# Patient Record
Sex: FEMALE | Race: WHITE | NOT HISPANIC OR LATINO | Employment: OTHER | URBAN - NONMETROPOLITAN AREA
[De-identification: names, ages, dates, MRNs, and addresses within clinical notes are randomized per-mention and may not be internally consistent; named-entity substitution may affect disease eponyms.]

---

## 2023-05-10 LAB
ALANINE AMINOTRANSFERASE (SGPT) (U/L) IN SER/PLAS: 16 U/L (ref 7–45)
ALBUMIN (G/DL) IN SER/PLAS: 4.1 G/DL (ref 3.4–5)
ALKALINE PHOSPHATASE (U/L) IN SER/PLAS: 70 U/L (ref 33–136)
ANION GAP IN SER/PLAS: 13 MMOL/L (ref 10–20)
ASPARTATE AMINOTRANSFERASE (SGOT) (U/L) IN SER/PLAS: 13 U/L (ref 9–39)
BASOPHILS (10*3/UL) IN BLOOD BY AUTOMATED COUNT: 0.11 X10E9/L (ref 0–0.1)
BASOPHILS/100 LEUKOCYTES IN BLOOD BY AUTOMATED COUNT: 1.2 % (ref 0–2)
BILIRUBIN TOTAL (MG/DL) IN SER/PLAS: 0.3 MG/DL (ref 0–1.2)
CALCIUM (MG/DL) IN SER/PLAS: 9.4 MG/DL (ref 8.6–10.3)
CARBON DIOXIDE, TOTAL (MMOL/L) IN SER/PLAS: 27 MMOL/L (ref 21–32)
CHLORIDE (MMOL/L) IN SER/PLAS: 102 MMOL/L (ref 98–107)
CHOLESTEROL (MG/DL) IN SER/PLAS: 175 MG/DL (ref 0–199)
CHOLESTEROL IN HDL (MG/DL) IN SER/PLAS: 43.8 MG/DL
CHOLESTEROL/HDL RATIO: 4
CREATININE (MG/DL) IN SER/PLAS: 0.72 MG/DL (ref 0.5–1.05)
EOSINOPHILS (10*3/UL) IN BLOOD BY AUTOMATED COUNT: 0.26 X10E9/L (ref 0–0.7)
EOSINOPHILS/100 LEUKOCYTES IN BLOOD BY AUTOMATED COUNT: 2.9 % (ref 0–6)
ERYTHROCYTE DISTRIBUTION WIDTH (RATIO) BY AUTOMATED COUNT: 15.1 % (ref 11.5–14.5)
ERYTHROCYTE MEAN CORPUSCULAR HEMOGLOBIN CONCENTRATION (G/DL) BY AUTOMATED: 31.9 G/DL (ref 32–36)
ERYTHROCYTE MEAN CORPUSCULAR VOLUME (FL) BY AUTOMATED COUNT: 94 FL (ref 80–100)
ERYTHROCYTES (10*6/UL) IN BLOOD BY AUTOMATED COUNT: 5.09 X10E12/L (ref 4–5.2)
GFR FEMALE: >90 ML/MIN/1.73M2
GLUCOSE (MG/DL) IN SER/PLAS: 85 MG/DL (ref 74–99)
HEMATOCRIT (%) IN BLOOD BY AUTOMATED COUNT: 47.6 % (ref 36–46)
HEMOGLOBIN (G/DL) IN BLOOD: 15.2 G/DL (ref 12–16)
IMMATURE GRANULOCYTES/100 LEUKOCYTES IN BLOOD BY AUTOMATED COUNT: 0.3 % (ref 0–0.9)
LDL: 95 MG/DL (ref 0–99)
LEUKOCYTES (10*3/UL) IN BLOOD BY AUTOMATED COUNT: 9 X10E9/L (ref 4.4–11.3)
LYMPHOCYTES (10*3/UL) IN BLOOD BY AUTOMATED COUNT: 3.37 X10E9/L (ref 1.2–4.8)
LYMPHOCYTES/100 LEUKOCYTES IN BLOOD BY AUTOMATED COUNT: 37.6 % (ref 13–44)
MONOCYTES (10*3/UL) IN BLOOD BY AUTOMATED COUNT: 0.81 X10E9/L (ref 0.1–1)
MONOCYTES/100 LEUKOCYTES IN BLOOD BY AUTOMATED COUNT: 9 % (ref 2–10)
NEUTROPHILS (10*3/UL) IN BLOOD BY AUTOMATED COUNT: 4.39 X10E9/L (ref 1.2–7.7)
NEUTROPHILS/100 LEUKOCYTES IN BLOOD BY AUTOMATED COUNT: 49 % (ref 40–80)
PLATELETS (10*3/UL) IN BLOOD AUTOMATED COUNT: 290 X10E9/L (ref 150–450)
POTASSIUM (MMOL/L) IN SER/PLAS: 4.5 MMOL/L (ref 3.5–5.3)
PROTEIN TOTAL: 6.7 G/DL (ref 6.4–8.2)
SODIUM (MMOL/L) IN SER/PLAS: 137 MMOL/L (ref 136–145)
THYROTROPIN (MIU/L) IN SER/PLAS BY DETECTION LIMIT <= 0.05 MIU/L: 1.65 MIU/L (ref 0.44–3.98)
TRIGLYCERIDE (MG/DL) IN SER/PLAS: 181 MG/DL (ref 0–149)
UREA NITROGEN (MG/DL) IN SER/PLAS: 15 MG/DL (ref 6–23)
VLDL: 36 MG/DL (ref 0–40)

## 2023-11-06 NOTE — PROGRESS NOTES
"Kenya Diallo is a 63 y.o. female who presents for Establish Care (Left knee pain, sometimes unable to walk; has had injections and xray; does not do flu shot)    Here to establish care.    Left knee pain: Hurting for a couple months. Started with a medication and got a shot but the pain didn't improve much. Pain with standing, climbing stairs. Had xray done as well. No specific injury. Superomedial surface of the knee.    Review of systems completed and unremarkable other than what is documented in HPI.     Social history: she smokes 1/2 ppd to 1 ppd, she does not drink alcohol, she is retired as of a year ago, working in factory  Medical history: None  Medications: None  SurgHx: breast reduction, cholecystectomy  Fhx: brother had cancer, started underneath the armpit and  8mo after her mom, mom had rheumatic fever and heart problems, had valve replacement, dad  in March of this year, unsure cause of death  Allergies: NKDA    Objective   /75 (BP Location: Left arm, Patient Position: Sitting, BP Cuff Size: Large adult)   Pulse 79   Ht 1.562 m (5' 1.5\")   Wt 83 kg (183 lb)   BMI 34.02 kg/m²     Gen: No acute distress, alert and oriented x3, pleasant   HEENT: moist mucous membranes, b/l external auditory canals are clear of debris, TMs within normal limits, no oropharyngeal lesions, eomi, perrla   Neck: thyroid within normal limits, no lymphadenopathy   CV: RRR, normal S1/S2, no murmur   Resp: Clear to auscultation bilaterally, no wheezes or rhonchi appreciated  Abd: soft, nontender, non-distended, no guarding/rigidity, bowel sounds present  Extr: no edema, no calf tenderness  Derm: Skin is warm and dry, no rashes appreciated  Psych: mood is good, affect is congruent, good hygiene, normal speech and eye contact  Neuro: cranial nerves grossly intact, normal gait  MSK: left knee with Mild effusion, severe ttp medial and lateral joint line    Assessment/Plan     #Knee pain  No improvement with " injections  Xray knee was normal  Ordering MRI    HCM:  Declining mammogram  Declining flu vaccine  Declining C-scope

## 2023-11-13 ENCOUNTER — OFFICE VISIT (OUTPATIENT)
Dept: PRIMARY CARE | Facility: CLINIC | Age: 63
End: 2023-11-13
Payer: COMMERCIAL

## 2023-11-13 VITALS
WEIGHT: 183 LBS | HEIGHT: 62 IN | DIASTOLIC BLOOD PRESSURE: 75 MMHG | SYSTOLIC BLOOD PRESSURE: 110 MMHG | BODY MASS INDEX: 33.68 KG/M2 | HEART RATE: 79 BPM

## 2023-11-13 DIAGNOSIS — M25.562 ACUTE PAIN OF LEFT KNEE: Primary | ICD-10-CM

## 2023-11-13 PROCEDURE — 1159F MED LIST DOCD IN RCRD: CPT | Performed by: FAMILY MEDICINE

## 2023-11-13 PROCEDURE — 99204 OFFICE O/P NEW MOD 45 MIN: CPT | Performed by: FAMILY MEDICINE

## 2023-11-13 PROCEDURE — 1036F TOBACCO NON-USER: CPT | Performed by: FAMILY MEDICINE

## 2023-11-13 NOTE — PATIENT INSTRUCTIONS
Orthopedics:   [facility] [phone]  [facility]: [phone] (Dr. Allen)    Radiology:  [place]:  [phone]

## 2023-12-01 ENCOUNTER — HOSPITAL ENCOUNTER (OUTPATIENT)
Dept: RADIOLOGY | Facility: HOSPITAL | Age: 63
Discharge: HOME | End: 2023-12-01
Payer: COMMERCIAL

## 2023-12-01 DIAGNOSIS — M25.562 ACUTE PAIN OF LEFT KNEE: ICD-10-CM

## 2023-12-01 PROCEDURE — 73721 MRI JNT OF LWR EXTRE W/O DYE: CPT | Mod: LT

## 2023-12-02 PROCEDURE — 73721 MRI JNT OF LWR EXTRE W/O DYE: CPT | Mod: LEFT SIDE | Performed by: RADIOLOGY

## 2023-12-05 ENCOUNTER — OFFICE VISIT (OUTPATIENT)
Dept: ORTHOPEDIC SURGERY | Facility: CLINIC | Age: 63
End: 2023-12-05
Payer: COMMERCIAL

## 2023-12-05 DIAGNOSIS — M25.562 ACUTE PAIN OF LEFT KNEE: Primary | ICD-10-CM

## 2023-12-05 PROCEDURE — 1036F TOBACCO NON-USER: CPT | Performed by: ORTHOPAEDIC SURGERY

## 2023-12-05 PROCEDURE — 1160F RVW MEDS BY RX/DR IN RCRD: CPT | Performed by: ORTHOPAEDIC SURGERY

## 2023-12-05 PROCEDURE — 1159F MED LIST DOCD IN RCRD: CPT | Performed by: ORTHOPAEDIC SURGERY

## 2023-12-05 PROCEDURE — 99204 OFFICE O/P NEW MOD 45 MIN: CPT | Performed by: ORTHOPAEDIC SURGERY

## 2023-12-05 RX ORDER — DICLOFENAC SODIUM 10 MG/G
4 GEL TOPICAL 4 TIMES DAILY
Qty: 244 G | Refills: 1 | Status: SHIPPED | OUTPATIENT
Start: 2023-12-05

## 2023-12-05 NOTE — LETTER
December 5, 2023     Kimberly Benavides DO  [address]  [place] [address]    Patient: Kenya Diallo   YOB: 1960   Date of Visit: 12/5/2023       Dear Dr. Kimberly Benavides DO:    Thank you for referring Kenya Diallo to me for evaluation. Below are my notes for this consultation.  If you have questions, please do not hesitate to call me. I look forward to following your patient along with you.       Sincerely,     Liborio Royal MD      CC: No Recipients  ______________________________________________________________________________________    This is a consultation from Dr. Kimberly Benavides DO for No chief complaint on file.      This is a 63 y.o. female who presents for evaluation of left knee pain.  This is a chronic issue the patient is going on since last summer.  She complains of sharp pain over the medial knee worse with walking proving with rest.  It began after she did extensive walking.  No numbness or tingling no fevers or chills no shooting pain down the leg.  She had an injection in the past which was not very helpful.  She has tried taking anti-inflammatories, but she has difficulty with stomach upset.  She has not not done any PT or worn a brace she does not use a cane.  No numbness or tingling.    Physical Exam    There has been no interval change in this patient's past medical, surgical, medications, allergies, family history or social history since the most recent visit to a provider within our department. 14 point review of systems was performed, reviewed, and negative except for pertinent positives documented in the history of present illness.     Constitutional: well developed, well nourished female in no acute distress  Psychiatric: normal mood, appropriate affect  Eyes: sclera anicteric  HENT: normocephalic/atraumatic  CV: regular rate and rhythm   Respiratory: non labored breathing  Integumentary: no rash  Neurological: moves all extremities    Left  knee exam: skin intact no lacerations or abrations.  1+ effusion.  Tender medial joint line. negative log roll negative patellar grind. ROM 0-120. stable to varus and valgus stress at 0 and 30 degrees. negative lachman negative posterior drawer negative madison. 5/5 ehl/fhl/gs/ta. silt s/s/sp/dp/t. 2+ dp/pt        Xrays were ordered by me, they were reviewed and independently interpreted by me today, they show mild degenerative changes, MRI was reviewed shows degenerative meniscal tear    Procedures      Impression/Plan: This is a 63 y.o. 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  I had an in depth discussion with the patient regarding treatment options for arthritis and their relative risks and benefits. We reviewed surgical and nonsurgical option for treatment. Treatments include anti inflammatory medications, physical therapy, weight loss, activity modification, use of assistive devices, injection therapies. We discussed current prescriptions and risks and benefits of continuation of prescription medication as apporpriate. We discussed that arthritis is often progressive over time, an in end stage arthritis surgical interventions can be considered, including arthroplasty. All questions were answered and the patient voiced their understanding.  Will try topical anti-inflammatory and physical therapy.  Will see her back and we can get repeat weightbearing x-rays if she still having symptoms or these measures are insufficient    BMI Readings from Last 1 Encounters:   12/01/23 34.02 kg/m²      Lab Results   Component Value Date    CREATININE 0.72 05/10/2023     Tobacco Use: Low Risk  (11/13/2023)    Patient History    • Smoking Tobacco Use: Never    • Smokeless Tobacco Use: Never    • Passive Exposure: Never      Computed MELD 3.0 unavailable. Necessary lab results were not found in the last year.  Computed MELD-Na unavailable. Necessary lab results were not found in the last year.       No  "results found for: \"HGBA1C\"  No results found for: \"STAPHMRSASCR\"  "

## 2023-12-05 NOTE — PROGRESS NOTES
This is a consultation from Dr. Kimberly Benavides DO for No chief complaint on file.      This is a 63 y.o. female who presents for evaluation of left knee pain.  This is a chronic issue the patient is going on since last summer.  She complains of sharp pain over the medial knee worse with walking proving with rest.  It began after she did extensive walking.  No numbness or tingling no fevers or chills no shooting pain down the leg.  She had an injection in the past which was not very helpful.  She has tried taking anti-inflammatories, but she has difficulty with stomach upset.  She has not not done any PT or worn a brace she does not use a cane.  No numbness or tingling.    Physical Exam    There has been no interval change in this patient's past medical, surgical, medications, allergies, family history or social history since the most recent visit to a provider within our department. 14 point review of systems was performed, reviewed, and negative except for pertinent positives documented in the history of present illness.     Constitutional: well developed, well nourished female in no acute distress  Psychiatric: normal mood, appropriate affect  Eyes: sclera anicteric  HENT: normocephalic/atraumatic  CV: regular rate and rhythm   Respiratory: non labored breathing  Integumentary: no rash  Neurological: moves all extremities    Left knee exam: skin intact no lacerations or abrations.  1+ effusion.  Tender medial joint line. negative log roll negative patellar grind. ROM 0-120. stable to varus and valgus stress at 0 and 30 degrees. negative lachman negative posterior drawer negative madison. 5/5 ehl/fhl/gs/ta. silt s/s/sp/dp/t. 2+ dp/pt        Xrays were ordered by me, they were reviewed and independently interpreted by me today, they show mild degenerative changes, MRI was reviewed shows degenerative meniscal tear    Procedures      Impression/Plan: This is a 63 y.o. female with left knee arthritis and  "degenerative meniscal tear.  I had an in depth discussion with the patient regarding treatment options for arthritis and their relative risks and benefits. We reviewed surgical and nonsurgical option for treatment. Treatments include anti inflammatory medications, physical therapy, weight loss, activity modification, use of assistive devices, injection therapies. We discussed current prescriptions and risks and benefits of continuation of prescription medication as apporpriate. We discussed that arthritis is often progressive over time, an in end stage arthritis surgical interventions can be considered, including arthroplasty. All questions were answered and the patient voiced their understanding.  Will try topical anti-inflammatory and physical therapy.  Will see her back and we can get repeat weightbearing x-rays if she still having symptoms or these measures are insufficient    BMI Readings from Last 1 Encounters:   12/01/23 34.02 kg/m²      Lab Results   Component Value Date    CREATININE 0.72 05/10/2023     Tobacco Use: Low Risk  (11/13/2023)    Patient History     Smoking Tobacco Use: Never     Smokeless Tobacco Use: Never     Passive Exposure: Never      Computed MELD 3.0 unavailable. Necessary lab results were not found in the last year.  Computed MELD-Na unavailable. Necessary lab results were not found in the last year.       No results found for: \"HGBA1C\"  No results found for: \"STAPHMRSASCR\"  "

## 2023-12-13 ENCOUNTER — EVALUATION (OUTPATIENT)
Dept: PHYSICAL THERAPY | Facility: HOSPITAL | Age: 63
End: 2023-12-13
Payer: MEDICARE

## 2023-12-13 DIAGNOSIS — M25.562 ACUTE PAIN OF LEFT KNEE: ICD-10-CM

## 2023-12-13 PROCEDURE — 97162 PT EVAL MOD COMPLEX 30 MIN: CPT | Mod: GP

## 2023-12-13 PROCEDURE — 97110 THERAPEUTIC EXERCISES: CPT | Mod: GP

## 2023-12-13 ASSESSMENT — ENCOUNTER SYMPTOMS
DEPRESSION: 0
LOSS OF SENSATION IN FEET: 0
OCCASIONAL FEELINGS OF UNSTEADINESS: 0

## 2023-12-13 ASSESSMENT — PAIN SCALES - GENERAL: PAINLEVEL_OUTOF10: 6

## 2023-12-13 ASSESSMENT — PAIN - FUNCTIONAL ASSESSMENT: PAIN_FUNCTIONAL_ASSESSMENT: 0-10

## 2023-12-13 NOTE — PROGRESS NOTES
Physical Therapy    Physical Therapy Evaluation and Treatment      Patient Name: Kenya Diallo  MRN: [id number]  Today's Date: 12/13/2023  Time Calculation  Start Time: 1130  Stop Time: 1210  Time Calculation (min): 40 min    Assessment:  PT Assessment  PT Assessment Results: Decreased strength, Decreased range of motion, Decreased endurance, Impaired balance, Obesity, Pain  Rehab Prognosis: Fair  Evaluation/Treatment Tolerance: Patient limited by pain, Patient limited by fatigue     Plan:  OP PT Plan  Treatment/Interventions: Education/ Instruction, Neuromuscular re-education, Therapeutic activities, Therapeutic exercises  PT Plan: Skilled PT  PT Frequency: 2 times per week  Duration: 4 wks  Certification Period Start Date: 12/13/23  Certification Period End Date: 02/13/24  Rehab Potential: Good  Plan of Care Agreement: Patient    Current Problem:   1. Acute pain of left knee  Referral to Physical Therapy    Follow Up In Physical Therapy    Follow Up In Physical Therapy          Subjective      General:  General  Reason for Referral: Eval and treat 2/2 L knee pain of 6 months duration.  Referred By: Dr. Royal  Past Medical History Relevant to Rehab: Patient addressed pain with PCP, who tried local injection without much improvement. She was subsequently referred to ortho for opinion. It was determined that she would be best managed conservatively initially and she was referred to PT.  General Comment: Receptive to PT management.    Precautions:  Precautions  STEADI Fall Risk Score (The score of 4 or more indicates an increased risk of falling): 2  Medical Precautions: No known precautions/limitation    Pain:  Pain Assessment  Pain Assessment: 0-10  Pain Score: 6  Pain Location: Knee  Pain Orientation: Left, Mid  Pain Frequency: Constant/continuous  Clinical Progression: Gradually worsening  Effect of Pain on Daily Activities: difficulty with stair climbing  Patient's Stated Pain Goal: No pain    Prior Level  of Function:  Prior Function Per Pt/Caregiver Report  Vocational: Retired    Objective     Extremity/Trunk Assessments:  AROM RLE (degrees)  R Knee Flexion 0-130: 136  R Knee Extension 0-130: 0  AROM LLE (degrees)  L Knee Flexion 0-130: 76  L Knee Extension 0-130: 0  Strength LLE  L Hip Flexion: 4/5  L Knee Flexion: 4-/5  L Knee Extension: 4/5  L Ankle Dorsiflexion: 5/5  L Ankle Plantar Flexion: 5/5    KNEE    Functional Rating Scale   33 LEFS    Observation   No effusion noted    Knee Palpation/Joint Mobility   NT to palpation    Knee AROM   Empty end feel 2/2 apprehension    Gait   Amb to dept without assistive devices.    Outcome Measures:  Timed Up and Go Test  TUG Manual: 14.6    Other Measures  5x Sit to Stand: 18.8  Lower Extremity Funtional Score (LEFS): 33     Treatments:  Therapeutic Exercise  Therapeutic Exercise Performed: Yes  Therapeutic Exercise Activity 1: Pro2 bike low intensity x 2 min.  Therapeutic Exercise Activity 2: Woo knee flexion 2x10  Therapeutic Exercise Activity 3: Woo hip extension 2x10  Therapeutic Exercise Activity 4: Heel slide 2x10  Therapeutic Exercise Activity 5: low amplitude SLR 1x10    EDUCATION:  Outpatient Education  Individual(s) Educated: Patient  Education Provided: Anatomy, Body Mechanics, Home Exercise Program  Risk and Benefits Discussed with Patient/Caregiver/Other: yes  Patient/Caregiver Demonstrated Understanding: yes  Plan of Care Discussed and Agreed Upon: yes  Patient Response to Education: Patient/Caregiver Verbalized Understanding of Information    Goals:  Active       PT Problem       The patient will demonstrate full understanding and competent performance of their home exercise program to maintain or potentially further improve their presenting condition.        Start:  12/13/23    Expected End:  02/13/24            The patient will ambulate 10 minutes continuously without pain increasing to greater than 1/10 to allow return to required community  ambulation tasks.        Start:  12/13/23    Expected End:  02/13/24            The patient will decrease their time in the timed up and go to <10 seconds to demonstrate improved balance and functional ambulatory ability.        Start:  12/13/23    Expected End:  02/13/24            Increase L knee AROM to >120 degrees with 5/5 MMT of the thigh ms. for normal joint mechanics.       Start:  12/13/23    Expected End:  02/13/24            Increase LEFS to >=55       Start:  12/13/23    Expected End:  02/13/24               PT Problem       The patient will improve their performance in the 5 time sit to stand test to < 10 to demonstrate increased balance and LE strength required for safe functional transfers and gait.        Start:  12/13/23    Expected End:  02/13/24

## 2023-12-15 ENCOUNTER — TREATMENT (OUTPATIENT)
Dept: PHYSICAL THERAPY | Facility: HOSPITAL | Age: 63
End: 2023-12-15
Payer: MEDICARE

## 2023-12-15 DIAGNOSIS — M25.562 ACUTE PAIN OF LEFT KNEE: ICD-10-CM

## 2023-12-15 PROCEDURE — 97110 THERAPEUTIC EXERCISES: CPT | Mod: GP,CQ

## 2023-12-15 ASSESSMENT — PAIN - FUNCTIONAL ASSESSMENT: PAIN_FUNCTIONAL_ASSESSMENT: 0-10

## 2023-12-15 ASSESSMENT — PAIN SCALES - GENERAL: PAINLEVEL_OUTOF10: 6

## 2023-12-15 NOTE — PROGRESS NOTES
Physical Therapy    Physical Therapy Treatment    Patient Name: Kenya Diallo  MRN: [id number]  Today's Date: 12/15/2023  Time Calculation  Start Time: 1012  Stop Time: 1051  Time Calculation (min): 39 min      Assessment:  PT Assessment  Assessment Comment: Patient was able to complete modest beginning to rehab fairly well. Occasionally modification made to reduce pain    Plan:  OP PT Plan  PT Plan: Skilled PT (2/8 tx)  Continue per POC and as patient tolerated     Current Problem  1. Acute pain of left knee  Follow Up In Physical Therapy          General  PT  Visit  PT Received On: 12/15/23  Response to Previous Treatment: Patient reporting fatigue but able to participate.  General  General Comment: Patient states she is feeling some soreness today, but no more than normal      Pain  Pain Assessment  Pain Assessment: 0-10  Pain Score: 6 (a little lower than usual)  Pain Location: Knee  Pain Orientation: Left, Mid    Treatments:  Therapeutic Exercise  Therapeutic Exercise Performed: Yes    Pro cycle x10 min, Woo knee flexion and hip ext 2x10 each, supine heel slides 2x10, modified SLR/SAQ off bolster 2x10, standing calf raises 3x10, standing hip abd 2x10 LLE only, seated EOB HS stretch 4x20 sec, QS 5 sec hold 2x10     Goals:  Active       PT Problem       The patient will demonstrate full understanding and competent performance of their home exercise program to maintain or potentially further improve their presenting condition.  (Progressing)       Start:  12/13/23    Expected End:  02/13/24            The patient will ambulate 10 minutes continuously without pain increasing to greater than 1/10 to allow return to required community ambulation tasks.  (Progressing)       Start:  12/13/23    Expected End:  02/13/24            The patient will decrease their time in the timed up and go to <10 seconds to demonstrate improved balance and functional ambulatory ability.  (Progressing)       Start:  12/13/23     Expected End:  02/13/24            Increase L knee AROM to >120 degrees with 5/5 MMT of the thigh ms. for normal joint mechanics. (Progressing)       Start:  12/13/23    Expected End:  02/13/24            Increase LEFS to >=55 (Progressing)       Start:  12/13/23    Expected End:  02/13/24               PT Problem       The patient will improve their performance in the 5 time sit to stand test to < 10 to demonstrate increased balance and LE strength required for safe functional transfers and gait.  (Progressing)       Start:  12/13/23    Expected End:  02/13/24

## 2023-12-19 ENCOUNTER — TREATMENT (OUTPATIENT)
Dept: PHYSICAL THERAPY | Facility: HOSPITAL | Age: 63
End: 2023-12-19
Payer: MEDICARE

## 2023-12-19 DIAGNOSIS — M25.562 ACUTE PAIN OF LEFT KNEE: ICD-10-CM

## 2023-12-19 PROCEDURE — 97110 THERAPEUTIC EXERCISES: CPT | Mod: GP,CQ

## 2023-12-19 ASSESSMENT — PAIN SCALES - GENERAL: PAINLEVEL_OUTOF10: 0 - NO PAIN

## 2023-12-19 ASSESSMENT — PAIN - FUNCTIONAL ASSESSMENT: PAIN_FUNCTIONAL_ASSESSMENT: 0-10

## 2023-12-19 NOTE — PROGRESS NOTES
Physical Therapy    Physical Therapy Treatment    Patient Name: Kenya Diallo  MRN: [id number]  Today's Date: 12/19/2023  Time Calculation  Start Time: 0900  Stop Time: 0940  Time Calculation (min): 40 min      Assessment:  PT Assessment  Assessment Comment: Patient noted to tolerate all exercises better today with overall improved ROM/strength    Plan:  OP PT Plan  PT Plan: Skilled PT (3/8 tx)  Continue per POC and as patient tolerated     Current Problem  1. Acute pain of left knee  Follow Up In Physical Therapy          General  PT  Visit  PT Received On: 12/19/23  Response to Previous Treatment: Patient with no complaints from previous session.  General  General Comment: Patient states Friday when she went to take her shoes off she felt a pop in the front of her knee. Initially had pain, but subsided. Also turned wrong when out to eat and then had difficulty walking. When waking up this morning though felt fine with no limp in her walk    Pain  Pain Assessment  Pain Assessment: 0-10  Pain Score: 0 - No pain  Pain Location: Knee  Pain Orientation: Left, Mid    AROM LLE (degrees)  L Knee Flexion 0-130: 120     Treatments:  Therapeutic Exercise  Therapeutic Exercise Performed: Yes    Pro cycle x10 min, Woo knee flexion 3x10 and hip ext 2x10 each, supine heel slides 3x10, modified SLR/SAQ off bolster 3x10, incline standing calf raises 3x10, standing hip abd 3x10 LLE only, seated EOB HS stretch 4x20 sec, QS 5 sec hold 3x10, quad stretch off chair 4x20 sec, STS 2x10    Goals:  Active       PT Problem       The patient will demonstrate full understanding and competent performance of their home exercise program to maintain or potentially further improve their presenting condition.  (Progressing)       Start:  12/13/23    Expected End:  02/13/24            The patient will ambulate 10 minutes continuously without pain increasing to greater than 1/10 to allow return to required community ambulation tasks.   (Progressing)       Start:  12/13/23    Expected End:  02/13/24            The patient will decrease their time in the timed up and go to <10 seconds to demonstrate improved balance and functional ambulatory ability.  (Progressing)       Start:  12/13/23    Expected End:  02/13/24            Increase L knee AROM to >120 degrees with 5/5 MMT of the thigh ms. for normal joint mechanics. (Progressing)       Start:  12/13/23    Expected End:  02/13/24            Increase LEFS to >=55 (Progressing)       Start:  12/13/23    Expected End:  02/13/24               PT Problem       The patient will improve their performance in the 5 time sit to stand test to < 10 to demonstrate increased balance and LE strength required for safe functional transfers and gait.  (Progressing)       Start:  12/13/23    Expected End:  02/13/24

## 2023-12-22 ENCOUNTER — TREATMENT (OUTPATIENT)
Dept: PHYSICAL THERAPY | Facility: HOSPITAL | Age: 63
End: 2023-12-22
Payer: MEDICARE

## 2023-12-22 DIAGNOSIS — M25.562 ACUTE PAIN OF LEFT KNEE: ICD-10-CM

## 2023-12-22 PROCEDURE — 97110 THERAPEUTIC EXERCISES: CPT | Mod: GP,CQ

## 2023-12-22 ASSESSMENT — PAIN - FUNCTIONAL ASSESSMENT: PAIN_FUNCTIONAL_ASSESSMENT: 0-10

## 2023-12-22 ASSESSMENT — PAIN SCALES - GENERAL: PAINLEVEL_OUTOF10: 5 - MODERATE PAIN

## 2023-12-22 NOTE — PROGRESS NOTES
Physical Therapy    Physical Therapy Treatment    Patient Name: Kenya Diallo  MRN: [id number]  Today's Date: 12/22/2023  Time Calculation  Start Time: 0926  Stop Time: 1006  Time Calculation (min): 40 min      Assessment:  PT Assessment  Assessment Comment: Pain reported reduction in pain with theraputic interventions.    Plan:  OP PT Plan  PT Plan: Skilled PT (4/8tx)  Continue per POC and as patient tolerated     Current Problem  1. Acute pain of left knee  Follow Up In Physical Therapy          General  PT  Visit  PT Received On: 12/22/23  Response to Previous Treatment: Patient with no complaints from previous session.  General  General Comment: Patient states today she is having pain in the front and back of her knee with it traveling down into her calf. Notices that her pain increases when she makes turns while walking      Pain  Pain Assessment  Pain Assessment: 0-10  Pain Score: 5 - Moderate pain  Pain Location: Knee  Pain Orientation: Left, Posterior, Anterior    Treatments:  Therapeutic Exercise  Therapeutic Exercise Performed: Yes    Pro cycle x10 min, Woo knee flexion 2.5# 3x10 and hip ext 2x10 each, supine heel slides 3x10, modified SLR/SAQ off bolster 3x10, incline standing calf raises 3x10, standing hip abd 3x10 LLE only, seated EOB HS stretch 4x20 sec, QS 5 sec hold 3x10, quad stretch off chair 4x20 sec, STS 3x10     Goals:  Active       PT Problem       The patient will demonstrate full understanding and competent performance of their home exercise program to maintain or potentially further improve their presenting condition.  (Progressing)       Start:  12/13/23    Expected End:  02/13/24            The patient will ambulate 10 minutes continuously without pain increasing to greater than 1/10 to allow return to required community ambulation tasks.  (Progressing)       Start:  12/13/23    Expected End:  02/13/24            The patient will decrease their time in the timed up and go to <10  seconds to demonstrate improved balance and functional ambulatory ability.  (Progressing)       Start:  12/13/23    Expected End:  02/13/24            Increase L knee AROM to >120 degrees with 5/5 MMT of the thigh ms. for normal joint mechanics. (Progressing)       Start:  12/13/23    Expected End:  02/13/24            Increase LEFS to >=55 (Progressing)       Start:  12/13/23    Expected End:  02/13/24               PT Problem       The patient will improve their performance in the 5 time sit to stand test to < 10 to demonstrate increased balance and LE strength required for safe functional transfers and gait.  (Progressing)       Start:  12/13/23    Expected End:  02/13/24

## 2023-12-27 ENCOUNTER — TREATMENT (OUTPATIENT)
Dept: PHYSICAL THERAPY | Facility: HOSPITAL | Age: 63
End: 2023-12-27
Payer: MEDICARE

## 2023-12-27 DIAGNOSIS — M25.562 ACUTE PAIN OF LEFT KNEE: ICD-10-CM

## 2023-12-27 PROCEDURE — 97110 THERAPEUTIC EXERCISES: CPT | Mod: GP,CQ

## 2023-12-27 ASSESSMENT — PAIN - FUNCTIONAL ASSESSMENT: PAIN_FUNCTIONAL_ASSESSMENT: 0-10

## 2023-12-27 ASSESSMENT — PAIN SCALES - GENERAL: PAINLEVEL_OUTOF10: 3

## 2023-12-27 ASSESSMENT — PAIN DESCRIPTION - DESCRIPTORS: DESCRIPTORS: THROBBING

## 2023-12-27 NOTE — PROGRESS NOTES
Physical Therapy    Physical Therapy Treatment    Patient Name: Kenya Diallo  MRN: [id number]  Today's Date: 12/27/2023       Time: 9:50 to 10:45       Assessment/Plan Pt. Able to tolerate all exercises today and had no c/o increased pain. Will continue PT POC and advance as tolerated.  PT Assessment  Assessment Comment: Pain reported reduction in pain with theraputic interventions.  PT Plan  Inpatient/Swing Bed or Outpatient: Outpatient  OP PT Plan  Treatment/Interventions: Education/ Instruction  PT Plan: Skilled PT  Number of Treatments Authorized: 5 of 8  Rehab Potential: Good      General Visit Information:   PT  Visit  PT Received On: 12/27/23  Response to Previous Treatment: Patient with no complaints from previous session.       Subjective P5t. Reports she is doing well and is compliant with HEP.Pain decreased to 3/10 this day    Objective Pt. Working on strengthening exercises to her L knee to improve L LE strength to increase knee support after meniscal tear.  Pain:  Pain Assessment  Pain Assessment: 0-10  Pain Score: 3  Pain Location: Knee  Pain Orientation: Left, Posterior  Pain Descriptors: Throbbing  Pain Frequency: Constant/continuous    Activity Tolerance:  Activity Tolerance  Endurance: Tolerates 30+ min exercise without fatigue  Treatments:  Therapeutic Exercise  Therapeutic Exercise Performed: Yes  Therapeutic Exercise Activity 1: bike LV 2 x 10 min.  Therapeutic Exercise Activity 2: total back HS curls with 2# x 3 x 10 ea.  Therapeutic Exercise Activity 3: total back hip ext. x2 x 10  Therapeutic Exercise Activity 4: supine heel slides with strap3 x 10  Therapeutic Exercise Activity 5: supine knee ext. with SLR 3 x 10 ea.  Therapeutic Exercise Activity 6: seated heel slide with QS 20 sec. x 10  Therapeutic Exercise Activity 7: heel raises standing 2 x 10  Therapeutic Exercise Activity 8: Quad stretch standing 20 sec. x 3  Therapeutic Exercise Activity 9: standing hip ABD 2 x 10    OP  EDUCATION:  Outpatient Education  Individual(s) Educated: Patient  Education Provided: Body Mechanics, Home Exercise Program  Patient/Caregiver Demonstrated Understanding: yes  Patient Response to Education: Patient/Caregiver Verbalized Understanding of Information    Active       PT Problem       The patient will demonstrate full understanding and competent performance of their home exercise program to maintain or potentially further improve their presenting condition.  (Progressing)       Start:  12/13/23    Expected End:  02/13/24            The patient will ambulate 10 minutes continuously without pain increasing to greater than 1/10 to allow return to required community ambulation tasks.  (Progressing)       Start:  12/13/23    Expected End:  02/13/24            The patient will decrease their time in the timed up and go to <10 seconds to demonstrate improved balance and functional ambulatory ability.  (Progressing)       Start:  12/13/23    Expected End:  02/13/24            Increase L knee AROM to >120 degrees with 5/5 MMT of the thigh ms. for normal joint mechanics. (Progressing)       Start:  12/13/23    Expected End:  02/13/24            Increase LEFS to >=55 (Progressing)       Start:  12/13/23    Expected End:  02/13/24               PT Problem       The patient will improve their performance in the 5 time sit to stand test to < 10 to demonstrate increased balance and LE strength required for safe functional transfers and gait.  (Progressing)       Start:  12/13/23    Expected End:  02/13/24

## 2023-12-28 ENCOUNTER — TREATMENT (OUTPATIENT)
Dept: PHYSICAL THERAPY | Facility: HOSPITAL | Age: 63
End: 2023-12-28
Payer: MEDICARE

## 2023-12-28 DIAGNOSIS — M25.562 ACUTE PAIN OF LEFT KNEE: ICD-10-CM

## 2023-12-28 PROCEDURE — 97110 THERAPEUTIC EXERCISES: CPT | Mod: GP,CQ

## 2023-12-28 ASSESSMENT — PAIN - FUNCTIONAL ASSESSMENT: PAIN_FUNCTIONAL_ASSESSMENT: 0-10

## 2023-12-28 ASSESSMENT — PAIN SCALES - GENERAL: PAINLEVEL_OUTOF10: 2

## 2023-12-28 ASSESSMENT — PAIN DESCRIPTION - DESCRIPTORS: DESCRIPTORS: THROBBING

## 2023-12-28 NOTE — PROGRESS NOTES
Physical Therapy    Physical Therapy Treatment    Patient Name: Kenya Diallo  MRN: [id number]  Today's Date: 12/28/2023     Time Calculation  Start Time: 0945  Stop Time: 1025  Time Calculation (min): 40 min    Assessment/Plan   PT Assessment  Assessment Comment: Patient stated her pain level increased towards end of session, decreased after short rest break  PT Plan  Inpatient/Swing Bed or Outpatient: Outpatient  OP PT Plan  Treatment/Interventions: Education/ Instruction  PT Plan: Skilled PT  Number of Treatments Authorized: 6 of 8  Rehab Potential: Good      General Visit Information:   PT  Visit  PT Received On: 12/28/23  Response to Previous Treatment: Patient with no complaints from previous session.  General  General Comment: Patient states she has had no changes in pain level or activity level since last visit. She is partially compliant with home exercise.    Subjective     General  General Comment: Patient states she has had no changes in pain level or activity level since last visit. She is partially compliant with home exercise.    Objective   Pain:  Pain Assessment  Pain Assessment: 0-10  Pain Score: 2  Pain Location: Knee  Pain Descriptors: Throbbing  Pain Frequency: Intermittent    Activity Tolerance  Endurance: Tolerates 30+ min exercise without fatigue  Treatments:  Therapeutic Exercise  Therapeutic Exercise Performed: Yes  d: Yes  Therapeutic Exercise Activity 1: bike LV 2 x 10 min.  Therapeutic Exercise Activity 2: total back HS curls with 2# x 3 x 10 ea.  Therapeutic Exercise Activity 3: total back hip ext. x2 x 10  Therapeutic Exercise Activity 4: supine heel slides with strap3 x 10  Therapeutic Exercise Activity 5: supine knee ext. with SLR 3 x 10 ea.  Therapeutic Exercise Activity 6: seated heel slide with QS 20 sec. x 10  Therapeutic Exercise Activity 7: heel raises standing 2 x 10  Therapeutic Exercise Activity 8: Quad stretch standing 20 sec. x 3  Therapeutic Exercise Activity 9:  standing hip ABD 2 x 10      OP EDUCATION:  Outpatient Education  Education Provided: Body Mechanics  Patient/Caregiver Demonstrated Understanding: yes  Patient Response to Education: Patient/Caregiver Verbalized Understanding of Information    Active       PT Problem       The patient will demonstrate full understanding and competent performance of their home exercise program to maintain or potentially further improve their presenting condition.  (Progressing)       Start:  12/13/23    Expected End:  02/13/24            The patient will ambulate 10 minutes continuously without pain increasing to greater than 1/10 to allow return to required community ambulation tasks.  (Progressing)       Start:  12/13/23    Expected End:  02/13/24            The patient will decrease their time in the timed up and go to <10 seconds to demonstrate improved balance and functional ambulatory ability.  (Progressing)       Start:  12/13/23    Expected End:  02/13/24            Increase L knee AROM to >120 degrees with 5/5 MMT of the thigh ms. for normal joint mechanics. (Progressing)       Start:  12/13/23    Expected End:  02/13/24            Increase LEFS to >=55 (Progressing)       Start:  12/13/23    Expected End:  02/13/24               PT Problem       The patient will improve their performance in the 5 time sit to stand test to < 10 to demonstrate increased balance and LE strength required for safe functional transfers and gait.  (Progressing)       Start:  12/13/23    Expected End:  02/13/24

## 2024-01-05 ENCOUNTER — DOCUMENTATION (OUTPATIENT)
Dept: PHYSICAL THERAPY | Facility: HOSPITAL | Age: 64
End: 2024-01-05

## 2024-01-05 ENCOUNTER — TREATMENT (OUTPATIENT)
Dept: PHYSICAL THERAPY | Facility: HOSPITAL | Age: 64
End: 2024-01-05
Payer: MEDICARE

## 2024-01-05 DIAGNOSIS — M25.562 ACUTE PAIN OF LEFT KNEE: ICD-10-CM

## 2024-01-05 PROCEDURE — 97110 THERAPEUTIC EXERCISES: CPT | Mod: GP

## 2024-01-05 ASSESSMENT — PAIN SCALES - GENERAL: PAINLEVEL_OUTOF10: 5 - MODERATE PAIN

## 2024-01-05 ASSESSMENT — PAIN - FUNCTIONAL ASSESSMENT: PAIN_FUNCTIONAL_ASSESSMENT: 0-10

## 2024-01-05 NOTE — PROGRESS NOTES
Physical Therapy    Physical Therapy Treatment    Patient Name: Kenya Diallo  MRN: [id number]  Today's Date: 1/5/2024  Time Calculation  Start Time: 0837  Stop Time: 0915  Time Calculation (min): 38 min      Assessment:  PT Assessment  Assessment Comment: Unfortunately the patient continues to have constant c/o L knee pain with ambulation at 5/10 or greater. She will be contacting her referring physician for further guidance and will continue her HEP.    Plan:  OP PT Plan  PT Plan: No Additional PT interventions required at this time  Number of Treatments Authorized: 7/8  Rehab Potential: Fair    Current Problem  1. Acute pain of left knee  Follow Up In Physical Therapy          General  PT  Visit  PT Received On: 01/05/24       Subjective    Pain  Pain Assessment  Pain Assessment: 0-10  Pain Score: 5 - Moderate pain  Pain Location: Knee  Pain Orientation: Inner    Objective   Extremity/Trunk Assessment  AROM LLE (degrees)  L Knee Flexion 0-130: 122    Activity Tolerance:  Improved since time of evaluation.    Outcome Measures:  Timed Up and Go Test  TUG Manual: 11    Other Measures  5x Sit to Stand: 12  Lower Extremity Funtional Score (LEFS): 27    Treatments:  Therapeutic Exercise  Therapeutic Exercise Performed: Yes  Therapeutic Exercise Activity 1: Pro2 x 10 min.  Therapeutic Exercise Activity 2: total back HS curls with 2# x 3 x 10 ea.  Therapeutic Exercise Activity 3: total back hip ext. x2 x 10  Therapeutic Exercise Activity 4: standing hip abduction 3x10x2.5#  Therapeutic Exercise Activity 5: calf raise 3x10  Therapeutic Exercise Activity 6: SAQ/SLR combination 3x10    Goals:  Active       PT Problem       The patient will demonstrate full understanding and competent performance of their home exercise program to maintain or potentially further improve their presenting condition.  (Met)       Start:  12/13/23    Expected End:  02/13/24    Resolved:  01/05/24         The patient will ambulate 10 minutes  continuously without pain increasing to greater than 1/10 to allow return to required community ambulation tasks.  (Not Progressing)       Start:  12/13/23    Expected End:  02/13/24         Goal Note       Still with constant pain 5/10 or greater.              The patient will decrease their time in the timed up and go to <10 seconds to demonstrate improved balance and functional ambulatory ability.  (Progressing)       Start:  12/13/23    Expected End:  02/13/24         Goal Note       11 sec              Increase L knee AROM to >120 degrees with 5/5 MMT of the thigh ms. for normal joint mechanics. (Met)       Start:  12/13/23    Expected End:  02/13/24    Resolved:  01/05/24      Goal Note       122 degrees L knee flexion              Increase LEFS to >=55 (Not Progressing)       Start:  12/13/23    Expected End:  02/13/24         Goal Note       27                 PT Problem       The patient will improve their performance in the 5 time sit to stand test to < 10 to demonstrate increased balance and LE strength required for safe functional transfers and gait.  (Progressing)       Start:  12/13/23    Expected End:  02/13/24         Goal Note       12 sec

## 2024-01-05 NOTE — PROGRESS NOTES
Physical Therapy    Discharge Summary    Name: Kenya Diallo  MRN: [id number]  : 1960  Date: 24    Discharge Summary: PT    Discharge Information: Date of discharge 24, Date of last visit 24, Referred by Dr. Royal, and Referred for L knee pain    Rehab Discharge Reason: Other Completed course without symptomatic improvement.

## 2025-05-06 NOTE — PROGRESS NOTES
Subjective   Patient ID: Kenya Diallo is a 65 y.o. female who presents for Leg Swelling (Left leg swelling and pain; no known cause of injury; has been an issue for over a year now; had a CT done and was told arthritis, saw a specialist for this and never contacted them again;   last week for a rash on both legs ).    [facility]   Kenya is here for continuing left knee pain     Left knee pain: Still hurting, at the Superomedial surface of the knee. Pain with standing, climbing stairs. Had xray, MRI, and did PT, states she has had no improvement. She had seen ortho in the past, was not happy because they gave her a cream and that was it. She wanted a knee replacement and they would not do one. Per imaging, she has arthritis and a meniscal tear. She had an injection in the past as well.  She has tried taking anti-inflammatories, but she has difficulty with stomach upset.She would like something done for her pain.     Had a cleaning buisiness in the past, herniated a disc, doctor stated she needed a surgery.     Review of systems completed and unremarkable other than what is documented in [facility].    Objective   /74 (BP Location: Right arm, Patient Position: Sitting, BP Cuff Size: Large adult)   Pulse 80   Wt 99.8 kg (220 lb)   BMI 40.90 kg/m²     Physical Exam  Gen: No acute distress, alert and oriented x3, pleasant   HEENT: moist mucous membranes, b/l external auditory canals are clear of debris, TMs within normal limits, no oropharyngeal lesions, eomi, perrla   Neck: thyroid within normal limits, no lymphadenopathy   CV: RRR, normal S1/S2, no murmur   Resp: Clear to auscultation bilaterally, no wheezes or rhonchi appreciated  Abd: soft, nontender, non-distended, no guarding/rigidity, bowel sounds present  Extr: Left knee  1+ effusion. Tender medial joint line. negative crepitus . negative lachman negative posterior drawer negative madison. Edema pitting +1   Derm: Skin is warm and dry, no rashes  appreciated  Psych: mood is good, affect is congruent, good hygiene, normal speech and eye contact  Neuro: cranial nerves grossly intact, normal gait  MSK: left knee with Mild effusion, severe ttp medial and lateral joint line    Assessment/Plan   #Knee pain  Referral to different ortho  Trial diclofenac as needed.  MRI with degenerative meniscus tear     HCM:  Declining mammogram  Declining flu vaccine  Declining C-scope

## 2025-05-09 ENCOUNTER — APPOINTMENT (OUTPATIENT)
Dept: PRIMARY CARE | Facility: CLINIC | Age: 65
End: 2025-05-09
Payer: MEDICARE

## 2025-05-09 VITALS
WEIGHT: 220 LBS | HEART RATE: 80 BPM | SYSTOLIC BLOOD PRESSURE: 116 MMHG | DIASTOLIC BLOOD PRESSURE: 74 MMHG | BODY MASS INDEX: 40.9 KG/M2

## 2025-05-09 DIAGNOSIS — M25.562 CHRONIC PAIN OF LEFT KNEE: Primary | ICD-10-CM

## 2025-05-09 DIAGNOSIS — G89.29 CHRONIC PAIN OF LEFT KNEE: Primary | ICD-10-CM

## 2025-05-09 PROCEDURE — 99213 OFFICE O/P EST LOW 20 MIN: CPT

## 2025-05-09 PROCEDURE — G2211 COMPLEX E/M VISIT ADD ON: HCPCS

## 2025-05-09 PROCEDURE — 1159F MED LIST DOCD IN RCRD: CPT

## 2025-05-09 RX ORDER — DICLOFENAC SODIUM 75 MG/1
75 TABLET, DELAYED RELEASE ORAL 2 TIMES DAILY PRN
Qty: 180 TABLET | Refills: 3 | Status: SHIPPED | OUTPATIENT
Start: 2025-05-09 | End: 2026-05-09

## 2025-05-09 NOTE — PATIENT INSTRUCTIONS
Orthopedics:   [facility] [phone]  [facility]: [phone]  [facility] () [phone]  Pedro Anderson ([facility]) [phone]

## 2025-07-23 ENCOUNTER — PATIENT OUTREACH (OUTPATIENT)
Dept: CARE COORDINATION | Facility: CLINIC | Age: 65
End: 2025-07-23
Payer: MEDICARE

## 2025-07-23 DIAGNOSIS — Z12.31 ENCOUNTER FOR SCREENING MAMMOGRAM FOR BREAST CANCER: ICD-10-CM

## 2025-08-02 ENCOUNTER — HOSPITAL ENCOUNTER (EMERGENCY)
Facility: HOSPITAL | Age: 65
Discharge: ANOTHER HOSPITAL | End: 2025-08-03
Attending: FAMILY MEDICINE
Payer: MEDICARE

## 2025-08-02 DIAGNOSIS — R06.02 SOB (SHORTNESS OF BREATH): ICD-10-CM

## 2025-08-02 DIAGNOSIS — J44.1 COPD WITH ACUTE EXACERBATION (MULTI): Primary | ICD-10-CM

## 2025-08-02 DIAGNOSIS — K43.9 ABDOMINAL WALL HERNIA: ICD-10-CM

## 2025-08-02 LAB
BASOPHILS # BLD AUTO: 0.07 X10*3/UL (ref 0–0.1)
BASOPHILS NFR BLD AUTO: 0.5 %
EOSINOPHIL # BLD AUTO: 0.29 X10*3/UL (ref 0–0.7)
EOSINOPHIL NFR BLD AUTO: 2.1 %
ERYTHROCYTE [DISTWIDTH] IN BLOOD BY AUTOMATED COUNT: 14.9 % (ref 11.5–14.5)
HCT VFR BLD AUTO: 45 % (ref 36–46)
HGB BLD-MCNC: 14.8 G/DL (ref 12–16)
IMM GRANULOCYTES # BLD AUTO: 0.06 X10*3/UL (ref 0–0.7)
IMM GRANULOCYTES NFR BLD AUTO: 0.4 % (ref 0–0.9)
LYMPHOCYTES # BLD AUTO: 3.55 X10*3/UL (ref 1.2–4.8)
LYMPHOCYTES NFR BLD AUTO: 26.2 %
MCH RBC QN AUTO: 29.3 PG (ref 26–34)
MCHC RBC AUTO-ENTMCNC: 32.9 G/DL (ref 32–36)
MCV RBC AUTO: 89 FL (ref 80–100)
MONOCYTES # BLD AUTO: 1.06 X10*3/UL (ref 0.1–1)
MONOCYTES NFR BLD AUTO: 7.8 %
NEUTROPHILS # BLD AUTO: 8.54 X10*3/UL (ref 1.2–7.7)
NEUTROPHILS NFR BLD AUTO: 63 %
NRBC BLD-RTO: 0 /100 WBCS (ref 0–0)
PLATELET # BLD AUTO: 282 X10*3/UL (ref 150–450)
RBC # BLD AUTO: 5.05 X10*6/UL (ref 4–5.2)
WBC # BLD AUTO: 13.6 X10*3/UL (ref 4.4–11.3)

## 2025-08-02 PROCEDURE — 83880 ASSAY OF NATRIURETIC PEPTIDE: CPT | Performed by: FAMILY MEDICINE

## 2025-08-02 PROCEDURE — 99285 EMERGENCY DEPT VISIT HI MDM: CPT | Performed by: FAMILY MEDICINE

## 2025-08-02 PROCEDURE — 84484 ASSAY OF TROPONIN QUANT: CPT | Performed by: FAMILY MEDICINE

## 2025-08-02 PROCEDURE — 2500000005 HC RX 250 GENERAL PHARMACY W/O HCPCS: Performed by: FAMILY MEDICINE

## 2025-08-02 PROCEDURE — 93005 ELECTROCARDIOGRAM TRACING: CPT

## 2025-08-02 PROCEDURE — 85025 COMPLETE CBC W/AUTO DIFF WBC: CPT | Performed by: FAMILY MEDICINE

## 2025-08-02 PROCEDURE — 80053 COMPREHEN METABOLIC PANEL: CPT | Performed by: FAMILY MEDICINE

## 2025-08-02 PROCEDURE — 83735 ASSAY OF MAGNESIUM: CPT | Performed by: FAMILY MEDICINE

## 2025-08-02 PROCEDURE — 36415 COLL VENOUS BLD VENIPUNCTURE: CPT | Performed by: FAMILY MEDICINE

## 2025-08-02 PROCEDURE — 83690 ASSAY OF LIPASE: CPT | Performed by: FAMILY MEDICINE

## 2025-08-02 RX ADMIN — Medication 1 DOSE: at 23:55

## 2025-08-02 ASSESSMENT — PAIN SCALES - GENERAL: PAINLEVEL_OUTOF10: 8

## 2025-08-02 ASSESSMENT — PAIN - FUNCTIONAL ASSESSMENT: PAIN_FUNCTIONAL_ASSESSMENT: 0-10

## 2025-08-03 ENCOUNTER — APPOINTMENT (OUTPATIENT)
Dept: CARDIOLOGY | Facility: HOSPITAL | Age: 65
End: 2025-08-03
Payer: MEDICARE

## 2025-08-03 ENCOUNTER — HOSPITAL ENCOUNTER (INPATIENT)
Facility: HOSPITAL | Age: 65
DRG: 190 | End: 2025-08-03
Attending: INTERNAL MEDICINE | Admitting: INTERNAL MEDICINE
Payer: MEDICARE

## 2025-08-03 ENCOUNTER — APPOINTMENT (OUTPATIENT)
Dept: RADIOLOGY | Facility: HOSPITAL | Age: 65
End: 2025-08-03
Payer: MEDICARE

## 2025-08-03 VITALS
BODY MASS INDEX: 40.29 KG/M2 | HEIGHT: 62 IN | DIASTOLIC BLOOD PRESSURE: 75 MMHG | RESPIRATION RATE: 20 BRPM | WEIGHT: 218.92 LBS | TEMPERATURE: 97.7 F | SYSTOLIC BLOOD PRESSURE: 121 MMHG | OXYGEN SATURATION: 97 % | HEART RATE: 98 BPM

## 2025-08-03 VITALS
WEIGHT: 215.4 LBS | OXYGEN SATURATION: 92 % | TEMPERATURE: 97.7 F | BODY MASS INDEX: 39.64 KG/M2 | HEART RATE: 97 BPM | RESPIRATION RATE: 24 BRPM | SYSTOLIC BLOOD PRESSURE: 132 MMHG | HEIGHT: 62 IN | DIASTOLIC BLOOD PRESSURE: 64 MMHG

## 2025-08-03 DIAGNOSIS — J96.01 ACUTE RESPIRATORY FAILURE WITH HYPOXIA: ICD-10-CM

## 2025-08-03 DIAGNOSIS — K46.0 INCARCERATED HERNIA: Primary | ICD-10-CM

## 2025-08-03 PROBLEM — J96.90 RESPIRATORY FAILURE: Status: ACTIVE | Noted: 2025-08-03

## 2025-08-03 PROBLEM — Z72.0 TOBACCO ABUSE: Status: ACTIVE | Noted: 2025-08-03

## 2025-08-03 LAB
ALBUMIN SERPL BCP-MCNC: 4.1 G/DL (ref 3.4–5)
ALP SERPL-CCNC: 82 U/L (ref 33–136)
ALT SERPL W P-5'-P-CCNC: 18 U/L (ref 7–45)
ANION GAP SERPL CALC-SCNC: 11 MMOL/L (ref 10–20)
AST SERPL W P-5'-P-CCNC: 14 U/L (ref 9–39)
BILIRUB SERPL-MCNC: 0.2 MG/DL (ref 0–1.2)
BNP SERPL-MCNC: 10 PG/ML (ref 0–99)
BUN SERPL-MCNC: 13 MG/DL (ref 6–23)
CALCIUM SERPL-MCNC: 9.7 MG/DL (ref 8.6–10.3)
CARDIAC TROPONIN I PNL SERPL HS: 3 NG/L (ref 0–13)
CARDIAC TROPONIN I PNL SERPL HS: <3 NG/L (ref 0–13)
CHLORIDE SERPL-SCNC: 102 MMOL/L (ref 98–107)
CO2 SERPL-SCNC: 27 MMOL/L (ref 21–32)
CREAT SERPL-MCNC: 0.72 MG/DL (ref 0.5–1.05)
EGFRCR SERPLBLD CKD-EPI 2021: >90 ML/MIN/1.73M*2
GLUCOSE SERPL-MCNC: 130 MG/DL (ref 74–99)
LIPASE SERPL-CCNC: 31 U/L (ref 9–82)
MAGNESIUM SERPL-MCNC: 2.17 MG/DL (ref 1.6–2.4)
POTASSIUM SERPL-SCNC: 3.9 MMOL/L (ref 3.5–5.3)
PROT SERPL-MCNC: 7.3 G/DL (ref 6.4–8.2)
SODIUM SERPL-SCNC: 136 MMOL/L (ref 136–145)

## 2025-08-03 PROCEDURE — 2500000004 HC RX 250 GENERAL PHARMACY W/ HCPCS (ALT 636 FOR OP/ED)

## 2025-08-03 PROCEDURE — 94664 DEMO&/EVAL PT USE INHALER: CPT

## 2025-08-03 PROCEDURE — 74177 CT ABD & PELVIS W/CONTRAST: CPT

## 2025-08-03 PROCEDURE — 9420000001 HC RT PATIENT EDUCATION 5 MIN

## 2025-08-03 PROCEDURE — 94640 AIRWAY INHALATION TREATMENT: CPT

## 2025-08-03 PROCEDURE — 96375 TX/PRO/DX INJ NEW DRUG ADDON: CPT

## 2025-08-03 PROCEDURE — 84484 ASSAY OF TROPONIN QUANT: CPT | Performed by: FAMILY MEDICINE

## 2025-08-03 PROCEDURE — 1200000002 HC GENERAL ROOM WITH TELEMETRY DAILY

## 2025-08-03 PROCEDURE — 99223 1ST HOSP IP/OBS HIGH 75: CPT | Performed by: INTERNAL MEDICINE

## 2025-08-03 PROCEDURE — 2500000002 HC RX 250 W HCPCS SELF ADMINISTERED DRUGS (ALT 637 FOR MEDICARE OP, ALT 636 FOR OP/ED): Performed by: INTERNAL MEDICINE

## 2025-08-03 PROCEDURE — 96374 THER/PROPH/DIAG INJ IV PUSH: CPT | Mod: 59

## 2025-08-03 PROCEDURE — 74177 CT ABD & PELVIS W/CONTRAST: CPT | Performed by: STUDENT IN AN ORGANIZED HEALTH CARE EDUCATION/TRAINING PROGRAM

## 2025-08-03 PROCEDURE — 2500000004 HC RX 250 GENERAL PHARMACY W/ HCPCS (ALT 636 FOR OP/ED): Performed by: INTERNAL MEDICINE

## 2025-08-03 PROCEDURE — 71045 X-RAY EXAM CHEST 1 VIEW: CPT | Performed by: STUDENT IN AN ORGANIZED HEALTH CARE EDUCATION/TRAINING PROGRAM

## 2025-08-03 PROCEDURE — 2500000002 HC RX 250 W HCPCS SELF ADMINISTERED DRUGS (ALT 637 FOR MEDICARE OP, ALT 636 FOR OP/ED): Performed by: FAMILY MEDICINE

## 2025-08-03 PROCEDURE — 99221 1ST HOSP IP/OBS SF/LOW 40: CPT | Performed by: STUDENT IN AN ORGANIZED HEALTH CARE EDUCATION/TRAINING PROGRAM

## 2025-08-03 PROCEDURE — 2550000001 HC RX 255 CONTRASTS: Performed by: FAMILY MEDICINE

## 2025-08-03 PROCEDURE — 2500000004 HC RX 250 GENERAL PHARMACY W/ HCPCS (ALT 636 FOR OP/ED): Performed by: FAMILY MEDICINE

## 2025-08-03 PROCEDURE — 71045 X-RAY EXAM CHEST 1 VIEW: CPT

## 2025-08-03 PROCEDURE — 36415 COLL VENOUS BLD VENIPUNCTURE: CPT | Performed by: FAMILY MEDICINE

## 2025-08-03 RX ORDER — IPRATROPIUM BROMIDE AND ALBUTEROL SULFATE 2.5; .5 MG/3ML; MG/3ML
3 SOLUTION RESPIRATORY (INHALATION)
Status: DISCONTINUED | OUTPATIENT
Start: 2025-08-03 | End: 2025-08-03

## 2025-08-03 RX ORDER — ACETAMINOPHEN 650 MG/1
650 SUPPOSITORY RECTAL EVERY 4 HOURS PRN
Status: DISCONTINUED | OUTPATIENT
Start: 2025-08-03 | End: 2025-08-04 | Stop reason: HOSPADM

## 2025-08-03 RX ORDER — MORPHINE SULFATE 4 MG/ML
4 INJECTION, SOLUTION INTRAMUSCULAR; INTRAVENOUS EVERY 4 HOURS PRN
Status: DISCONTINUED | OUTPATIENT
Start: 2025-08-03 | End: 2025-08-04 | Stop reason: HOSPADM

## 2025-08-03 RX ORDER — LORAZEPAM 2 MG/ML
0.5 INJECTION INTRAMUSCULAR ONCE
Status: COMPLETED | OUTPATIENT
Start: 2025-08-03 | End: 2025-08-03

## 2025-08-03 RX ORDER — ACETAMINOPHEN 325 MG/1
650 TABLET ORAL EVERY 4 HOURS PRN
Status: DISCONTINUED | OUTPATIENT
Start: 2025-08-03 | End: 2025-08-04 | Stop reason: HOSPADM

## 2025-08-03 RX ORDER — SODIUM CHLORIDE, SODIUM LACTATE, POTASSIUM CHLORIDE, CALCIUM CHLORIDE 600; 310; 30; 20 MG/100ML; MG/100ML; MG/100ML; MG/100ML
100 INJECTION, SOLUTION INTRAVENOUS CONTINUOUS
Status: ACTIVE | OUTPATIENT
Start: 2025-08-03 | End: 2025-08-04

## 2025-08-03 RX ORDER — IPRATROPIUM BROMIDE AND ALBUTEROL SULFATE 2.5; .5 MG/3ML; MG/3ML
3 SOLUTION RESPIRATORY (INHALATION) EVERY 2 HOUR PRN
Status: DISCONTINUED | OUTPATIENT
Start: 2025-08-03 | End: 2025-08-04 | Stop reason: HOSPADM

## 2025-08-03 RX ORDER — BUDESONIDE 0.5 MG/2ML
0.5 INHALANT ORAL
Status: DISCONTINUED | OUTPATIENT
Start: 2025-08-03 | End: 2025-08-04 | Stop reason: HOSPADM

## 2025-08-03 RX ORDER — ONDANSETRON HYDROCHLORIDE 2 MG/ML
4 INJECTION, SOLUTION INTRAVENOUS EVERY 8 HOURS PRN
Status: DISCONTINUED | OUTPATIENT
Start: 2025-08-03 | End: 2025-08-04 | Stop reason: HOSPADM

## 2025-08-03 RX ORDER — FAMOTIDINE 10 MG/ML
20 INJECTION, SOLUTION INTRAVENOUS ONCE
Status: COMPLETED | OUTPATIENT
Start: 2025-08-03 | End: 2025-08-03

## 2025-08-03 RX ORDER — IPRATROPIUM BROMIDE AND ALBUTEROL SULFATE 2.5; .5 MG/3ML; MG/3ML
6 SOLUTION RESPIRATORY (INHALATION) ONCE
Status: COMPLETED | OUTPATIENT
Start: 2025-08-03 | End: 2025-08-03

## 2025-08-03 RX ORDER — TALC
3 POWDER (GRAM) TOPICAL NIGHTLY PRN
Status: DISCONTINUED | OUTPATIENT
Start: 2025-08-03 | End: 2025-08-04 | Stop reason: HOSPADM

## 2025-08-03 RX ORDER — KETOROLAC TROMETHAMINE 15 MG/ML
INJECTION, SOLUTION INTRAMUSCULAR; INTRAVENOUS
Status: COMPLETED
Start: 2025-08-03 | End: 2025-08-03

## 2025-08-03 RX ORDER — FAMOTIDINE 10 MG/ML
INJECTION, SOLUTION INTRAVENOUS
Status: COMPLETED
Start: 2025-08-03 | End: 2025-08-03

## 2025-08-03 RX ORDER — MORPHINE SULFATE 2 MG/ML
2 INJECTION, SOLUTION INTRAMUSCULAR; INTRAVENOUS EVERY 4 HOURS PRN
Status: DISCONTINUED | OUTPATIENT
Start: 2025-08-03 | End: 2025-08-04 | Stop reason: HOSPADM

## 2025-08-03 RX ORDER — ONDANSETRON 4 MG/1
4 TABLET, ORALLY DISINTEGRATING ORAL EVERY 8 HOURS PRN
Status: DISCONTINUED | OUTPATIENT
Start: 2025-08-03 | End: 2025-08-04 | Stop reason: HOSPADM

## 2025-08-03 RX ORDER — KETOROLAC TROMETHAMINE 15 MG/ML
15 INJECTION, SOLUTION INTRAMUSCULAR; INTRAVENOUS EVERY 6 HOURS PRN
Status: DISCONTINUED | OUTPATIENT
Start: 2025-08-03 | End: 2025-08-04 | Stop reason: HOSPADM

## 2025-08-03 RX ORDER — IPRATROPIUM BROMIDE AND ALBUTEROL SULFATE 2.5; .5 MG/3ML; MG/3ML
3 SOLUTION RESPIRATORY (INHALATION)
Status: DISCONTINUED | OUTPATIENT
Start: 2025-08-04 | End: 2025-08-04 | Stop reason: HOSPADM

## 2025-08-03 RX ORDER — KETOROLAC TROMETHAMINE 15 MG/ML
15 INJECTION, SOLUTION INTRAMUSCULAR; INTRAVENOUS ONCE
Status: COMPLETED | OUTPATIENT
Start: 2025-08-03 | End: 2025-08-03

## 2025-08-03 RX ORDER — ACETAMINOPHEN 160 MG/5ML
650 SOLUTION ORAL EVERY 4 HOURS PRN
Status: DISCONTINUED | OUTPATIENT
Start: 2025-08-03 | End: 2025-08-04 | Stop reason: HOSPADM

## 2025-08-03 RX ADMIN — METHYLPREDNISOLONE SODIUM SUCCINATE 125 MG: 125 INJECTION, POWDER, FOR SOLUTION INTRAMUSCULAR; INTRAVENOUS at 02:11

## 2025-08-03 RX ADMIN — KETOROLAC TROMETHAMINE 15 MG: 15 INJECTION, SOLUTION INTRAMUSCULAR; INTRAVENOUS at 00:16

## 2025-08-03 RX ADMIN — FAMOTIDINE 20 MG: 10 INJECTION, SOLUTION INTRAVENOUS at 00:16

## 2025-08-03 RX ADMIN — METHYLPREDNISOLONE SODIUM SUCCINATE 40 MG: 40 INJECTION, POWDER, FOR SOLUTION INTRAMUSCULAR; INTRAVENOUS at 23:43

## 2025-08-03 RX ADMIN — IPRATROPIUM BROMIDE AND ALBUTEROL SULFATE 6 ML: 2.5; .5 SOLUTION RESPIRATORY (INHALATION) at 00:20

## 2025-08-03 RX ADMIN — SODIUM CHLORIDE, SODIUM LACTATE, POTASSIUM CHLORIDE, AND CALCIUM CHLORIDE 100 ML/HR: 600; 310; 30; 20 INJECTION, SOLUTION INTRAVENOUS at 11:17

## 2025-08-03 RX ADMIN — IOHEXOL 75 ML: 350 INJECTION, SOLUTION INTRAVENOUS at 01:05

## 2025-08-03 RX ADMIN — LORAZEPAM 0.5 MG: 2 INJECTION INTRAMUSCULAR; INTRAVENOUS at 00:20

## 2025-08-03 RX ADMIN — BUDESONIDE 0.5 MG: 0.5 INHALANT RESPIRATORY (INHALATION) at 19:14

## 2025-08-03 RX ADMIN — AZITHROMYCIN MONOHYDRATE 500 MG: 500 INJECTION, POWDER, LYOPHILIZED, FOR SOLUTION INTRAVENOUS at 11:17

## 2025-08-03 RX ADMIN — IPRATROPIUM BROMIDE AND ALBUTEROL SULFATE 3 ML: 2.5; .5 SOLUTION RESPIRATORY (INHALATION) at 11:09

## 2025-08-03 RX ADMIN — IPRATROPIUM BROMIDE AND ALBUTEROL SULFATE 3 ML: 2.5; .5 SOLUTION RESPIRATORY (INHALATION) at 19:14

## 2025-08-03 RX ADMIN — BUDESONIDE 0.5 MG: 0.5 INHALANT RESPIRATORY (INHALATION) at 11:09

## 2025-08-03 RX ADMIN — METHYLPREDNISOLONE SODIUM SUCCINATE 40 MG: 40 INJECTION, POWDER, FOR SOLUTION INTRAMUSCULAR; INTRAVENOUS at 11:16

## 2025-08-03 SDOH — ECONOMIC STABILITY: FOOD INSECURITY: WITHIN THE PAST 12 MONTHS, THE FOOD YOU BOUGHT JUST DIDN'T LAST AND YOU DIDN'T HAVE MONEY TO GET MORE.: NEVER TRUE

## 2025-08-03 SDOH — SOCIAL STABILITY: SOCIAL INSECURITY: ARE YOU OR HAVE YOU BEEN THREATENED OR ABUSED PHYSICALLY, EMOTIONALLY, OR SEXUALLY BY ANYONE?: NO

## 2025-08-03 SDOH — SOCIAL STABILITY: SOCIAL INSECURITY: HAVE YOU HAD THOUGHTS OF HARMING ANYONE ELSE?: NO

## 2025-08-03 SDOH — SOCIAL STABILITY: SOCIAL INSECURITY: WITHIN THE LAST YEAR, HAVE YOU BEEN HUMILIATED OR EMOTIONALLY ABUSED IN OTHER WAYS BY YOUR PARTNER OR EX-PARTNER?: NO

## 2025-08-03 SDOH — SOCIAL STABILITY: SOCIAL INSECURITY: WITHIN THE LAST YEAR, HAVE YOU BEEN AFRAID OF YOUR PARTNER OR EX-PARTNER?: NO

## 2025-08-03 SDOH — SOCIAL STABILITY: SOCIAL INSECURITY
WITHIN THE LAST YEAR, HAVE YOU BEEN RAPED OR FORCED TO HAVE ANY KIND OF SEXUAL ACTIVITY BY YOUR PARTNER OR EX-PARTNER?: NO

## 2025-08-03 SDOH — ECONOMIC STABILITY: FOOD INSECURITY: WITHIN THE PAST 12 MONTHS, YOU WORRIED THAT YOUR FOOD WOULD RUN OUT BEFORE YOU GOT THE MONEY TO BUY MORE.: NEVER TRUE

## 2025-08-03 SDOH — SOCIAL STABILITY: SOCIAL INSECURITY: HAS ANYONE EVER THREATENED TO HURT YOUR FAMILY OR YOUR PETS?: NO

## 2025-08-03 SDOH — SOCIAL STABILITY: SOCIAL INSECURITY
WITHIN THE LAST YEAR, HAVE YOU BEEN KICKED, HIT, SLAPPED, OR OTHERWISE PHYSICALLY HURT BY YOUR PARTNER OR EX-PARTNER?: NO

## 2025-08-03 SDOH — SOCIAL STABILITY: SOCIAL INSECURITY: DO YOU FEEL UNSAFE GOING BACK TO THE PLACE WHERE YOU ARE LIVING?: NO

## 2025-08-03 SDOH — SOCIAL STABILITY: SOCIAL INSECURITY: HAVE YOU HAD ANY THOUGHTS OF HARMING ANYONE ELSE?: NO

## 2025-08-03 SDOH — ECONOMIC STABILITY: INCOME INSECURITY: IN THE PAST 12 MONTHS HAS THE ELECTRIC, GAS, OIL, OR WATER COMPANY THREATENED TO SHUT OFF SERVICES IN YOUR HOME?: NO

## 2025-08-03 SDOH — SOCIAL STABILITY: SOCIAL INSECURITY: ARE THERE ANY APPARENT SIGNS OF INJURIES/BEHAVIORS THAT COULD BE RELATED TO ABUSE/NEGLECT?: NO

## 2025-08-03 SDOH — SOCIAL STABILITY: SOCIAL INSECURITY: ABUSE: ADULT

## 2025-08-03 SDOH — SOCIAL STABILITY: SOCIAL INSECURITY: WERE YOU ABLE TO COMPLETE ALL THE BEHAVIORAL HEALTH SCREENINGS?: YES

## 2025-08-03 SDOH — SOCIAL STABILITY: SOCIAL INSECURITY: DO YOU FEEL ANYONE HAS EXPLOITED OR TAKEN ADVANTAGE OF YOU FINANCIALLY OR OF YOUR PERSONAL PROPERTY?: NO

## 2025-08-03 SDOH — SOCIAL STABILITY: SOCIAL INSECURITY: DOES ANYONE TRY TO KEEP YOU FROM HAVING/CONTACTING OTHER FRIENDS OR DOING THINGS OUTSIDE YOUR HOME?: NO

## 2025-08-03 ASSESSMENT — COGNITIVE AND FUNCTIONAL STATUS - GENERAL
DAILY ACTIVITIY SCORE: 24
MOBILITY SCORE: 24
PATIENT BASELINE BEDBOUND: NO
DAILY ACTIVITIY SCORE: 24
MOBILITY SCORE: 24

## 2025-08-03 ASSESSMENT — PAIN SCALES - GENERAL
PAINLEVEL_OUTOF10: 5 - MODERATE PAIN
PAINLEVEL_OUTOF10: 0 - NO PAIN
PAINLEVEL_OUTOF10: 8
PAINLEVEL_OUTOF10: 0 - NO PAIN
PAINLEVEL_OUTOF10: 0 - NO PAIN

## 2025-08-03 ASSESSMENT — LIFESTYLE VARIABLES
SKIP TO QUESTIONS 9-10: 1
HOW OFTEN DO YOU HAVE 6 OR MORE DRINKS ON ONE OCCASION: NEVER
AUDIT-C TOTAL SCORE: 0
HOW OFTEN DO YOU HAVE A DRINK CONTAINING ALCOHOL: NEVER
AUDIT-C TOTAL SCORE: 0
HOW MANY STANDARD DRINKS CONTAINING ALCOHOL DO YOU HAVE ON A TYPICAL DAY: PATIENT DOES NOT DRINK

## 2025-08-03 ASSESSMENT — ENCOUNTER SYMPTOMS
CONSTIPATION: 0
VOMITING: 1
DIZZINESS: 0
APPETITE CHANGE: 0
EYE PAIN: 0
JOINT SWELLING: 0
AGITATION: 0
COUGH: 1
HEMATURIA: 0
ABDOMINAL PAIN: 1
ACTIVITY CHANGE: 1

## 2025-08-03 ASSESSMENT — ACTIVITIES OF DAILY LIVING (ADL)
WALKS IN HOME: INDEPENDENT
JUDGMENT_ADEQUATE_SAFELY_COMPLETE_DAILY_ACTIVITIES: YES
FEEDING YOURSELF: INDEPENDENT
BATHING: INDEPENDENT
HEARING - RIGHT EAR: FUNCTIONAL
ADEQUATE_TO_COMPLETE_ADL: YES
PATIENT'S MEMORY ADEQUATE TO SAFELY COMPLETE DAILY ACTIVITIES?: YES
GROOMING: INDEPENDENT
LACK_OF_TRANSPORTATION: NO
TOILETING: INDEPENDENT
DRESSING YOURSELF: INDEPENDENT
HEARING - LEFT EAR: FUNCTIONAL

## 2025-08-03 ASSESSMENT — PAIN DESCRIPTION - LOCATION
LOCATION: ABDOMEN
LOCATION: ABDOMEN

## 2025-08-03 ASSESSMENT — PAIN DESCRIPTION - ORIENTATION: ORIENTATION: RIGHT

## 2025-08-03 ASSESSMENT — PATIENT HEALTH QUESTIONNAIRE - PHQ9
SUM OF ALL RESPONSES TO PHQ9 QUESTIONS 1 & 2: 0
1. LITTLE INTEREST OR PLEASURE IN DOING THINGS: NOT AT ALL
2. FEELING DOWN, DEPRESSED OR HOPELESS: NOT AT ALL

## 2025-08-03 ASSESSMENT — PAIN DESCRIPTION - DESCRIPTORS
DESCRIPTORS: SHARP
DESCRIPTORS: SHARP

## 2025-08-03 ASSESSMENT — PAIN - FUNCTIONAL ASSESSMENT
PAIN_FUNCTIONAL_ASSESSMENT: 0-10

## 2025-08-03 NOTE — CONSULTS
Reason For Consult  Ventral hernia    History Of Present Illness  Kenya Diallo is a 65 y.o. female presenting with Chronic fat containing ventral epigastric hernia. She was in her general health when she had an coughing and emesis. She came to the hospital and CT showed fat containing hernia. This hernia appears to become worse with COPD symptoms. She is still having flatus, she has an appetite and no there symptoms at this time.     Past Medical History  She has a past medical history of Arthritis, COPD (chronic obstructive pulmonary disease) (Multi), and Other specified health status.    Surgical History  She has a past surgical history that includes Other surgical history (04/22/2019) and Other surgical history (04/22/2019).     Social History  She reports that she has been smoking cigarettes. She has never been exposed to tobacco smoke. She has never used smokeless tobacco. She reports that she does not drink alcohol. No history on file for drug use.    Family History  Family History[1]     Allergies  Patient has no known allergies.    Review of Systems  Review of Systems   Constitutional:  Positive for activity change. Negative for appetite change.   HENT:  Negative for hearing loss.    Eyes:  Negative for pain.   Respiratory:  Positive for cough.    Cardiovascular:  Negative for chest pain and leg swelling.   Gastrointestinal:  Positive for abdominal pain and vomiting. Negative for constipation.   Genitourinary:  Negative for hematuria.   Musculoskeletal:  Negative for joint swelling.   Skin:  Negative for pallor.   Neurological:  Negative for dizziness.   Psychiatric/Behavioral:  Negative for agitation.          Physical Exam  Physical Exam  Constitutional:       Appearance: She is obese.   HENT:      Head: Normocephalic.      Mouth/Throat:      Mouth: Mucous membranes are dry.     Eyes:      Extraocular Movements: Extraocular movements intact.       Cardiovascular:      Rate and Rhythm: Normal rate.  "     Pulses: Normal pulses.   Pulmonary:      Effort: No respiratory distress.      Breath sounds: No stridor.   Chest:      Chest wall: No tenderness.   Abdominal:      Comments: Obese abdomen with epigastric ventral hernia. The hernia is soft and compressible, no abdominal distension or skin changes noted.      Neurological:      Mental Status: She is alert.           Last Recorded Vitals  Blood pressure 123/65, pulse 93, temperature 36.4 °C (97.5 °F), temperature source Oral, resp. rate 20, height 1.575 m (5' 2.01\"), weight 97.7 kg (215 lb 6.4 oz), SpO2 94%.    Relevant Results    IMPRESSION:  1. Large supraumbilical fat containing hernia demonstrating CT  features seen with the strangulation including vascular and  engorgement/congestion and stranding in the hernia sac.      2. Right ovarian cyst with imperceptibly thin wall and simple fluid  density measuring 5.8 cm x 5.5 x 5.3 cm. Given patient's age, this  should be evaluated with nonemergent ultrasound pelvis for any  changes of internal complex evaluate suggest a cystic ovarian  neoplasm. YELLOW ALERT: An incidental finding alert was sent per  protocol.      3. Colonic diverticulosis without evidence of acute diverticulitis.      Assessment/Plan     Ms. Diallo is a 64 yo female presenting with COPD symptoms making her chronic ventral hernia worse. She does not have concerning findings for strangulated bowel. The fat containing hernia could be managed as an outpatient however prior to any repair, she would have to discuss weight loss and control of her COPD symptoms for the best chance at a durable repair with fewer complications.     I spent 40 minutes in the professional and overall care of this patient.      Leonid Ricci DO         [1] No family history on file.    "

## 2025-08-03 NOTE — H&P
"History Of Present Illness  Kenya Diallo is a 65 y.o. female presenting with abdominal pain and shortness of breath..  Patient has a long history of a hernia that has been gradually getting larger, becoming painful yesterday and associated with nausea and vomiting.  No new fevers or chills.  She is feeling improved now.  She presented to [facility], CT scan showing the incarcerated ventral hernia and patient was transferred to this facility; hospitalist was asked to admit due to new respiratory failure    Going into the respiratory note, patient initially tells me that she has about 15 years of smoking, but then states that she started smoking when she was 13 when she was allowed, and only has about 10 years having quit, so the smoking history is more 32 years.  She has had a \"chest cold\" for the last 4 weeks, but she actually states that it felt like it was starting to improve on the day of presentation this was not the chief complaint.  In the emergency department, she was found to be wheezing and placed on 3 L nasal cannula.  She does not have a diagnosis of COPD and is not on any inhalers.     Past Medical History  She has a past medical history of Arthritis, COPD (chronic obstructive pulmonary disease) (Multi), and Other specified health status.    Surgical History  She has a past surgical history that includes Other surgical history (04/22/2019) and Other surgical history (04/22/2019).     Social History  She reports that she has been smoking cigarettes. She has never been exposed to tobacco smoke. She has never used smokeless tobacco. No history on file for alcohol use and drug use.    Family History  Family History[1]     Allergies  Patient has no known allergies.    Review of Systems   All other systems reviewed and are negative.       Physical Exam  Constitutional:       Appearance: She is obese. She is not toxic-appearing.   HENT:      Right Ear: External ear normal.      Left " Ear: External ear normal.     Eyes:      Conjunctiva/sclera: Conjunctivae normal.     Pulmonary:      Comments: 3 L nasal cannula.  Prolonged expirations.  Bilateral wheezing.  Abdominal:      Comments: Bulging protrusion superior to the umbilicus, erythematous, warm to touch, tender.  No rebound.     Musculoskeletal:      Right lower leg: No edema.      Left lower leg: No edema.          Last Recorded Vitals  There were no vitals taken for this visit.    Relevant Results      Results for orders placed or performed during the hospital encounter of 08/02/25 (from the past 24 hours)   CBC with Differential   Result Value Ref Range    WBC 13.6 (H) 4.4 - 11.3 x10*3/uL    nRBC 0.0 0.0 - 0.0 /100 WBCs    RBC 5.05 4.00 - 5.20 x10*6/uL    Hemoglobin 14.8 12.0 - 16.0 g/dL    Hematocrit 45.0 36.0 - 46.0 %    MCV 89 80 - 100 fL    MCH 29.3 26.0 - 34.0 pg    MCHC 32.9 32.0 - 36.0 g/dL    RDW 14.9 (H) 11.5 - 14.5 %    Platelets 282 150 - 450 x10*3/uL    Neutrophils % 63.0 40.0 - 80.0 %    Immature Granulocytes %, Automated 0.4 0.0 - 0.9 %    Lymphocytes % 26.2 13.0 - 44.0 %    Monocytes % 7.8 2.0 - 10.0 %    Eosinophils % 2.1 0.0 - 6.0 %    Basophils % 0.5 0.0 - 2.0 %    Neutrophils Absolute 8.54 (H) 1.20 - 7.70 x10*3/uL    Immature Granulocytes Absolute, Automated 0.06 0.00 - 0.70 x10*3/uL    Lymphocytes Absolute 3.55 1.20 - 4.80 x10*3/uL    Monocytes Absolute 1.06 (H) 0.10 - 1.00 x10*3/uL    Eosinophils Absolute 0.29 0.00 - 0.70 x10*3/uL    Basophils Absolute 0.07 0.00 - 0.10 x10*3/uL   Comprehensive Metabolic Panel   Result Value Ref Range    Glucose 130 (H) 74 - 99 mg/dL    Sodium 136 136 - 145 mmol/L    Potassium 3.9 3.5 - 5.3 mmol/L    Chloride 102 98 - 107 mmol/L    Bicarbonate 27 21 - 32 mmol/L    Anion Gap 11 10 - 20 mmol/L    Urea Nitrogen 13 6 - 23 mg/dL    Creatinine 0.72 0.50 - 1.05 mg/dL    eGFR >90 >60 mL/min/1.73m*2    Calcium 9.7 8.6 - 10.3 mg/dL    Albumin 4.1 3.4 - 5.0 g/dL    Alkaline Phosphatase 82 33 - 136  U/L    Total Protein 7.3 6.4 - 8.2 g/dL    AST 14 9 - 39 U/L    Bilirubin, Total 0.2 0.0 - 1.2 mg/dL    ALT 18 7 - 45 U/L   Brain Natriuretic Peptide   Result Value Ref Range    BNP 10 0 - 99 pg/mL   Troponin I, High Sensitivity, Initial   Result Value Ref Range    Troponin I, High Sensitivity 3 0 - 13 ng/L   Lipase   Result Value Ref Range    Lipase 31 9 - 82 U/L   Magnesium   Result Value Ref Range    Magnesium 2.17 1.60 - 2.40 mg/dL   Red Top   Result Value Ref Range    Extra Tube Hold for add-ons.    Gray Top   Result Value Ref Range    Extra Tube Hold for add-ons.    Troponin, High Sensitivity, 1 Hour   Result Value Ref Range    Troponin I, High Sensitivity <3 0 - 13 ng/L         Assessment/Plan   Assessment & Plan  Incarcerated hernia  Incarcerated fat, but reviewed with surgery and not to incarcerated hernia.  Patient to have diet  Pain control as needed  Respiratory failure  Strong suspicion that the patient has underlying COPD, and there is currently wheezing on exam  Start azithromycin.  IV Solu-Medrol 40 mg IV twice a day, scheduled DuoNeb, as needed DuoNeb, inhaled Pulmicort.  Patient will need pulmonary function test on discharge.  Tobacco abuse  I think there may be some minimization of this as the patient thought that she was only smoking for about 15 years, but given her age, this is more in the 35 range.  Nicotine patch can be made available               Alli Betancourt DO           [1] No family history on file.

## 2025-08-03 NOTE — ASSESSMENT & PLAN NOTE
Incarcerated fat, but reviewed with surgery and not to incarcerated hernia.  Patient to have diet  Pain control as needed

## 2025-08-03 NOTE — ED NOTES
"Patient states she has had a cough/\"chest cold\" going on for 4 weeks. States 8/10 sharp abdominal pain that wraps around epigastric. No SOB.      Pasha Mix RN  08/02/25 [id number]    "

## 2025-08-03 NOTE — CARE PLAN
The patient's goals for the shift include  ambulate     The clinical goals for the shift include wean 02

## 2025-08-03 NOTE — NURSING NOTE
Pt arrived to room 453 from [facility]-no report called. Pt alert & oriented. Nc 3l in place, denies sob. Respirations even and unlabored. Oriented to room, call light, tv remote, phone. Call light within reach. Hospitalist on call notified of arrival.

## 2025-08-03 NOTE — ASSESSMENT & PLAN NOTE
I think there may be some minimization of this as the patient thought that she was only smoking for about 15 years, but given her age, this is more in the 35 range.  Nicotine patch can be made available

## 2025-08-03 NOTE — ASSESSMENT & PLAN NOTE
Strong suspicion that the patient has underlying COPD, and there is currently wheezing on exam  Start azithromycin.  IV Solu-Medrol 40 mg IV twice a day, scheduled DuoNeb, as needed DuoNeb, inhaled Pulmicort.  Patient will need pulmonary function test on discharge.

## 2025-08-03 NOTE — ED PROVIDER NOTES
Emergency Department Provider Note       History of Present Illness     History provided by: Patient and Significant Other  Limitations to History: None  External Records Reviewed with Brief Summary: Reviewed care everywhere and the epic chart    HPI:  Kenya Diallo is a 65 y.o. female comes to the ED with complaint of generalized shortness of breath with wheezing for the last several weeks that is worsened over the last 2 days.  Patient also reports having dealt with a ventral hernia that For the last 6 months that she still trying to find someone to repair.  Patient reports that today her belly pain increased and she vomited several times with last bout being just prior to arrival in the ED.  Patient in the ED is alert, cooperative, appears very anxious requiring redirection, uncomfortable, but in no distress.  Patient describes her pain as sharp and rates it 8/10.  Patient noted to be mildly hypoxic on arrival requiring supplemental oxygen.  Patient reports no other associate symptoms or plaints at this time.    Physical Exam   Triage vitals:  T 36.5 °C (97.7 °F)    BP (!) 173/94  RR (!) 22  O2 90 % None (Room air)    Physical Exam  Vitals and nursing note reviewed.   Constitutional:       Appearance: Normal appearance.   HENT:      Head: Normocephalic and atraumatic.      Nose: Nose normal.      Mouth/Throat:      Mouth: Mucous membranes are moist.      Pharynx: Oropharynx is clear.     Eyes:      Extraocular Movements: Extraocular movements intact.      Conjunctiva/sclera: Conjunctivae normal.      Pupils: Pupils are equal, round, and reactive to light.       Cardiovascular:      Rate and Rhythm: Normal rate and regular rhythm.      Pulses: Normal pulses.      Heart sounds: Normal heart sounds, S1 normal and S2 normal.   Pulmonary:      Effort: Tachypnea present.      Breath sounds: Normal air entry. Wheezing present.   Abdominal:      General: Abdomen is flat.      Palpations: Abdomen is  soft.      Tenderness: There is abdominal tenderness in the epigastric area, periumbilical area and left upper quadrant.      Hernia: A hernia is present. Hernia is present in the ventral area.     Musculoskeletal:         General: Normal range of motion.      Cervical back: Normal range of motion and neck supple.     Skin:     General: Skin is warm.      Capillary Refill: Capillary refill takes less than 2 seconds.     Neurological:      General: No focal deficit present.      Mental Status: She is alert and oriented to person, place, and time.      GCS: GCS eye subscore is 4. GCS verbal subscore is 5. GCS motor subscore is 6.     Psychiatric:         Attention and Perception: Attention and perception normal.         Mood and Affect: Mood is anxious. Affect is tearful.         Speech: Speech is rapid and pressured.         Behavior: Behavior is hyperactive. Behavior is cooperative.         Thought Content: Thought content normal.         Labs Reviewed   CBC WITH AUTO DIFFERENTIAL - Abnormal       Result Value    WBC 13.6 (*)     nRBC 0.0      RBC 5.05      Hemoglobin 14.8      Hematocrit 45.0      MCV 89      MCH 29.3      MCHC 32.9      RDW 14.9 (*)     Platelets 282      Neutrophils % 63.0      Immature Granulocytes %, Automated 0.4      Lymphocytes % 26.2      Monocytes % 7.8      Eosinophils % 2.1      Basophils % 0.5      Neutrophils Absolute 8.54 (*)     Immature Granulocytes Absolute, Automated 0.06      Lymphocytes Absolute 3.55      Monocytes Absolute 1.06 (*)     Eosinophils Absolute 0.29      Basophils Absolute 0.07     COMPREHENSIVE METABOLIC PANEL - Abnormal    Glucose 130 (*)     Sodium 136      Potassium 3.9      Chloride 102      Bicarbonate 27      Anion Gap 11      Urea Nitrogen 13      Creatinine 0.72      eGFR >90      Calcium 9.7      Albumin 4.1      Alkaline Phosphatase 82      Total Protein 7.3      AST 14      Bilirubin, Total 0.2      ALT 18     B-TYPE NATRIURETIC PEPTIDE - Normal    BNP  10      Narrative:        <100 pg/mL - Heart failure unlikely  100-299 pg/mL - Intermediate probability of acute heart                  failure exacerbation. Correlate with clinical                  context and patient history.    >=300 pg/mL - Heart Failure likely. Correlate with clinical                  context and patient history.    BNP testing is performed using different testing methodology at [facility] than at other [facility]. Direct result comparisons should only be made within the same method.      SERIAL TROPONIN-INITIAL - Normal    Troponin I, High Sensitivity 3      Narrative:     Less than 99th percentile of normal range cutoff-  Female and children under 18 years old <14 ng/L; Male <21 ng/L: Negative  Repeat testing should be performed if clinically indicated.     Female and children under 18 years old 14-50 ng/L; Male 21-50 ng/L:  Consistent with possible cardiac damage and possible increased clinical   risk. Serial measurements may help to assess extent of myocardial damage.     >50 ng/L: Consistent with cardiac damage, increased clinical risk and  myocardial infarction. Serial measurements may help assess extent of   myocardial damage.      NOTE: Children less than 1 year old may have higher baseline troponin   levels and results should be interpreted in conjunction with the overall   clinical context.     NOTE: Troponin I testing is performed using a different   testing methodology at [facility] than at other   [facility]. Direct result comparisons should only   be made within the same method.   SERIAL TROPONIN, 1 HOUR - Normal    Troponin I, High Sensitivity <3      Narrative:     Less than 99th percentile of normal range cutoff-  Female and children under 18 years old <14 ng/L; Male <21 ng/L: Negative  Repeat testing should be performed if clinically indicated.     Female and children under 18 years old 14-50 ng/L; Male 21-50 ng/L:  Consistent with  possible cardiac damage and possible increased clinical   risk. Serial measurements may help to assess extent of myocardial damage.     >50 ng/L: Consistent with cardiac damage, increased clinical risk and  myocardial infarction. Serial measurements may help assess extent of   myocardial damage.      NOTE: Children less than 1 year old may have higher baseline troponin   levels and results should be interpreted in conjunction with the overall   clinical context.     NOTE: Troponin I testing is performed using a different   testing methodology at [facility] than at other   [facility]. Direct result comparisons should only   be made within the same method.   LIPASE - Normal    Lipase 31      Narrative:     Venipuncture immediately after or during the administration of Metamizole may lead to falsely low results. Testing should be performed immediately prior to Metamizole dosing.   MAGNESIUM - Normal    Magnesium 2.17     TROPONIN SERIES- (INITIAL, 1 HR)    Narrative:     The following orders were created for panel order Troponin I Series, High Sensitivity (0, 1 HR).  Procedure                               Abnormality         Status                     ---------                               -----------         ------                     Troponin I, High Sensiti...[[id number]]  Normal              Final result               Troponin, High Sensitivi...[[id number]]  Normal              Final result                 Please view results for these tests on the individual orders.   GRAY TOP    Extra Tube Hold for add-ons.       XR chest 1 view   Final Result   Mild bibasilar atelectasis without acute disease in the chest   otherwise.             MACRO:   None.        Signed by: Patrice Flannery 8/3/2025 1:47 AM   Dictation workstation:   [id number]      CT abdomen pelvis w IV contrast   Final Result   1. Large supraumbilical fat containing hernia demonstrating CT   features seen with the strangulation  including vascular and   engorgement/congestion and stranding in the hernia sac.        2. Right ovarian cyst with imperceptibly thin wall and simple fluid   density measuring 5.8 cm x 5.5 x 5.3 cm. Given patient's age, this   should be evaluated with nonemergent ultrasound pelvis for any   changes of internal complex evaluate suggest a cystic ovarian   neoplasm. YELLOW ALERT: An incidental finding alert was sent per   protocol.        3. Colonic diverticulosis without evidence of acute diverticulitis.        MACRO:   Critical Finding:  See findings. Notification was initiated on   8/3/2025 at 1:54 am by  Patrice Flannery.  (**-YCF-**) Instructions:   See Findings.        Signed by: Patrice Flannery 8/3/2025 1:54 AM   Dictation workstation:   [id number]          Medical Decision Making & ED Course       Pt upon arrival to the ED appeared to be very anxious requiring redirection and uncomfortable with mild tachypnea and mild hypoxia.  Discussed with pt the presenting complaints and clinically findings.  Reviewed with pt the epic chart and counseled the patient on shortness of breath/abdominal pain and appropriate approach to management/treatments.  After assessment and evaluation patient placed on supplemental oxygen, started on aerosol treatments, IV line placed, labs sent, IV fluids given, imaging ordered, EKG reviewed, given IV pain medication, IV Zofran, IV Solu-Medrol, placed on cardiac monitor, and observed.  After treatment of your rash patient was reassessed and found to have a little improvement of her pain, no longer feeling nauseous, shortness of breath/wheezing steadily improved but require continued oxygen, patient cannot be weaned off her sentimental oxygen, and vital signs steadily improving.  Final results were reviewed discussed with patient and findings appear to be concerning for COPD exacerbation and fat stranding and strangulation within the hernia per imaging.  At this time Case discussed with  on-call general surgery and general medicine at [facility] and after discussion was agreed patient be admitted to their facility with consultation to general surgery for continued management of her findings and discussion regarding repair of her ventral hernia.  Patient was informed this and comfortable plan of care the patient Kept stable in the ED and transferred.      Social Determinants of Health which Significantly Impact Care: Social Determinants of Health which Significantly Impact Care: None identified     EKG Independent Interpretation:2343 -- NSR rate 102.  Normal axis.  Normal intervals.  Nonspecific ST-T wave changes with no findings of STEMI. Unchanged compared to old EKG    Independent Result Review and Interpretation:     Chronic conditions affecting the patient's care: As documented above in [facility]    The patient was discussed with the following consultants/services: Hospitalist/Admitting Provider who accepted the patient for admission and general surgery    Care Considerations: As documented above in [facility]    ED Course:  Diagnoses as of 08/03/25 0704   Abdominal wall hernia   SOB (shortness of breath)   COPD with acute exacerbation (Multi)       Disposition   As a result of their workup, the patient will require transfer to another facility.  The patient and/or her guardian/representative is agreeable to transfer at this time.   We will continue to monitor and manage the patient in the Emergency Department until transport for transfer can be arranged.    Procedures   Procedures        Lionel Dukes MD  Emergency Medicine                                                       Lionel Dukes MD  08/03/25 0717

## 2025-08-04 VITALS
WEIGHT: 218.8 LBS | DIASTOLIC BLOOD PRESSURE: 76 MMHG | OXYGEN SATURATION: 88 % | HEIGHT: 62 IN | HEART RATE: 68 BPM | RESPIRATION RATE: 18 BRPM | SYSTOLIC BLOOD PRESSURE: 122 MMHG | BODY MASS INDEX: 40.27 KG/M2 | TEMPERATURE: 97.7 F

## 2025-08-04 LAB
ALBUMIN SERPL BCP-MCNC: 3.6 G/DL (ref 3.4–5)
ALP SERPL-CCNC: 69 U/L (ref 33–136)
ALT SERPL W P-5'-P-CCNC: 13 U/L (ref 7–45)
ANION GAP SERPL CALCULATED.3IONS-SCNC: 10 MMOL/L (ref 10–20)
AST SERPL W P-5'-P-CCNC: 13 U/L (ref 9–39)
ATRIAL RATE: 102 BPM
BASOPHILS # BLD AUTO: 0.04 X10*3/UL (ref 0–0.1)
BASOPHILS NFR BLD AUTO: 0.2 %
BILIRUB SERPL-MCNC: 0.3 MG/DL (ref 0–1.2)
BUN SERPL-MCNC: 14 MG/DL (ref 6–23)
CALCIUM SERPL-MCNC: 9.3 MG/DL (ref 8.6–10.3)
CHLORIDE SERPL-SCNC: 105 MMOL/L (ref 98–107)
CO2 SERPL-SCNC: 27 MMOL/L (ref 21–32)
CREAT SERPL-MCNC: 0.54 MG/DL (ref 0.5–1.05)
EGFRCR SERPLBLD CKD-EPI 2021: >90 ML/MIN/1.73M*2
EOSINOPHIL # BLD AUTO: 0 X10*3/UL (ref 0–0.7)
EOSINOPHIL NFR BLD AUTO: 0 %
ERYTHROCYTE [DISTWIDTH] IN BLOOD BY AUTOMATED COUNT: 15.4 % (ref 11.5–14.5)
GLUCOSE SERPL-MCNC: 144 MG/DL (ref 74–99)
HCT VFR BLD AUTO: 39.6 % (ref 36–46)
HGB BLD-MCNC: 13.9 G/DL (ref 12–16)
HOLD SPECIMEN: NORMAL
HOLD SPECIMEN: NORMAL
IMM GRANULOCYTES # BLD AUTO: 0.13 X10*3/UL (ref 0–0.7)
IMM GRANULOCYTES NFR BLD AUTO: 0.6 % (ref 0–0.9)
LYMPHOCYTES # BLD AUTO: 1.33 X10*3/UL (ref 1.2–4.8)
LYMPHOCYTES NFR BLD AUTO: 5.8 %
MCH RBC QN AUTO: 30.6 PG (ref 26–34)
MCHC RBC AUTO-ENTMCNC: 35.1 G/DL (ref 32–36)
MCV RBC AUTO: 87 FL (ref 80–100)
MONOCYTES # BLD AUTO: 0.52 X10*3/UL (ref 0.1–1)
MONOCYTES NFR BLD AUTO: 2.2 %
NEUTROPHILS # BLD AUTO: 21.11 X10*3/UL (ref 1.2–7.7)
NEUTROPHILS NFR BLD AUTO: 91.2 %
NRBC BLD-RTO: 0 /100 WBCS (ref 0–0)
P AXIS: 68 DEGREES
P OFFSET: 212 MS
P ONSET: 154 MS
PHOSPHATE SERPL-MCNC: 2.6 MG/DL (ref 2.5–4.9)
PLATELET # BLD AUTO: 263 X10*3/UL (ref 150–450)
POTASSIUM SERPL-SCNC: 4.2 MMOL/L (ref 3.5–5.3)
PR INTERVAL: 146 MS
PROT SERPL-MCNC: 6.4 G/DL (ref 6.4–8.2)
Q ONSET: 227 MS
QRS COUNT: 16 BEATS
QRS DURATION: 80 MS
QT INTERVAL: 346 MS
QTC CALCULATION(BAZETT): 450 MS
QTC FREDERICIA: 412 MS
R AXIS: 48 DEGREES
RBC # BLD AUTO: 4.54 X10*6/UL (ref 4–5.2)
SODIUM SERPL-SCNC: 138 MMOL/L (ref 136–145)
T AXIS: 55 DEGREES
T OFFSET: 400 MS
VENTRICULAR RATE: 102 BPM
WBC # BLD AUTO: 23.1 X10*3/UL (ref 4.4–11.3)

## 2025-08-04 PROCEDURE — 2500000002 HC RX 250 W HCPCS SELF ADMINISTERED DRUGS (ALT 637 FOR MEDICARE OP, ALT 636 FOR OP/ED): Performed by: INTERNAL MEDICINE

## 2025-08-04 PROCEDURE — 80053 COMPREHEN METABOLIC PANEL: CPT | Performed by: INTERNAL MEDICINE

## 2025-08-04 PROCEDURE — 84100 ASSAY OF PHOSPHORUS: CPT | Performed by: INTERNAL MEDICINE

## 2025-08-04 PROCEDURE — 85025 COMPLETE CBC W/AUTO DIFF WBC: CPT | Performed by: INTERNAL MEDICINE

## 2025-08-04 PROCEDURE — 99232 SBSQ HOSP IP/OBS MODERATE 35: CPT | Performed by: NURSE PRACTITIONER

## 2025-08-04 PROCEDURE — 99232 SBSQ HOSP IP/OBS MODERATE 35: CPT | Performed by: INTERNAL MEDICINE

## 2025-08-04 PROCEDURE — 36415 COLL VENOUS BLD VENIPUNCTURE: CPT | Performed by: INTERNAL MEDICINE

## 2025-08-04 PROCEDURE — 94640 AIRWAY INHALATION TREATMENT: CPT

## 2025-08-04 PROCEDURE — 2500000004 HC RX 250 GENERAL PHARMACY W/ HCPCS (ALT 636 FOR OP/ED): Performed by: INTERNAL MEDICINE

## 2025-08-04 RX ORDER — PREDNISONE 10 MG/1
TABLET ORAL
Qty: 28 TABLET | Refills: 0 | Status: SHIPPED | OUTPATIENT
Start: 2025-08-04 | End: 2025-08-12 | Stop reason: ALTCHOICE

## 2025-08-04 RX ADMIN — AZITHROMYCIN MONOHYDRATE 500 MG: 500 INJECTION, POWDER, LYOPHILIZED, FOR SOLUTION INTRAVENOUS at 08:37

## 2025-08-04 RX ADMIN — BUDESONIDE 0.5 MG: 0.5 INHALANT RESPIRATORY (INHALATION) at 07:34

## 2025-08-04 RX ADMIN — IPRATROPIUM BROMIDE AND ALBUTEROL SULFATE 3 ML: 2.5; .5 SOLUTION RESPIRATORY (INHALATION) at 07:34

## 2025-08-04 RX ADMIN — METHYLPREDNISOLONE SODIUM SUCCINATE 40 MG: 40 INJECTION, POWDER, FOR SOLUTION INTRAMUSCULAR; INTRAVENOUS at 10:31

## 2025-08-04 ASSESSMENT — PAIN SCALES - GENERAL: PAINLEVEL_OUTOF10: 0 - NO PAIN

## 2025-08-04 NOTE — DISCHARGE SUMMARY
Discharge Diagnosis  Acute hypoxic respiratory failure  Suspected acute COPD exacerbation  Tobacco use  Abdominal hernia    Issues Requiring Follow-Up      Discharge Meds     Medication List      START taking these medications     predniSONE 10 mg tablet; Commonly known as: Deltasone; Take 2 tablets   (20 mg) by mouth 2 times a day for 4 days, THEN 1 tablet (10 mg) 2 times a   day for 4 days, THEN 1 tablet (10 mg) once daily for 4 days.; Start taking   on: August 4, 2025     ASK your doctor about these medications     diclofenac 75 mg EC tablet; Commonly known as: Voltaren; Take 1 tablet   (75 mg) by mouth 2 times a day as needed (pain). Do not crush, chew, or   split.       Test Results Pending At Discharge  Pending Labs       No current pending labs.            Hospital Course  65-year-old female patient with a long history of smoking presented to the emergency department at [facility] [place], operated by [facility] for shortness of breath and abdominal discomfort.  She had an abdominal hernia which was painful.  A chest x-ray showed atelectasis and a CT scan of the abdomen and pelvis showed a fat-containing anterior abdominal wall hernia.  She was started on IV steroids and bronchodilators.  She was seen by the on-call general surgeon who determined that this hernia was not incarcerated.  The next day, she reported feeling better, however she was still desaturating with oxygen saturation of 88% on room air with ambulation.  It was recommended that she receive additional treatments however she declined to stay in the hospital any further and left AGAINST MEDICAL ADVICE.      Outpatient Follow-Up  No future appointments.      Maria L Stark MD

## 2025-08-04 NOTE — CONSULTS
Inpatient consult to Acute Care Surgery  Consult performed by: CASSANDRA Ring-CNP  Consult ordered by: Alli Betancourt DO      See 8/3 Dr Ricci consult.

## 2025-08-04 NOTE — PROGRESS NOTES
"Kenya Diallo is a 65 y.o. female on day 1 of admission presenting with shortness of breath and an umbilical hernia.     Subjective   She presented with abdominal pain and shortness of breath.  She has an abdominal hernia that has been getting larger and was painful.  A chest x-ray showed atelectasis.  A CT scan of the abdomen and pelvis showed a fat-containing anterior abdominal hernia.  She was seen by the on-call general surgeon who indicated that there were no concerning findings for strangulated bowel.  This morning, she informed her nurse that she would be leaving at noon because she feels better.  We did a trial of ambulation and oxygen saturation decreased to 88% on room air.    Objective     Physical Exam  General: awake, alert, oriented, responsive  Cardiovascular: regular, normal S1 and S2  Lungs: good air entry bilaterally,  Extremities: no peripheral cyanosis, no pedal edema  Neuro: alert, oriented x 3, no focal weakness      Last Recorded Vitals  Blood pressure 122/76, pulse 68, temperature 36.5 °C (97.7 °F), temperature source Oral, resp. rate 18, height 1.575 m (5' 2.01\"), weight 99.2 kg (218 lb 12.8 oz), SpO2 (!) 88%.  Intake/Output last 3 Shifts:  I/O last 3 completed shifts:  In: 1506.7 (15.2 mL/kg) [P.O.:240; I.V.:1016.7 (10.2 mL/kg); IV Piggyback:250]  Out: 900 (9.1 mL/kg) [Urine:900 (0.3 mL/kg/hr)]  Weight: 99.2 kg     Relevant Results  Lab Results   Component Value Date    WBC 23.1 (H) 08/04/2025    HGB 13.9 08/04/2025    HCT 39.6 08/04/2025    MCV 87 08/04/2025     08/04/2025     Lab Results   Component Value Date    GLUCOSE 144 (H) 08/04/2025    CALCIUM 9.3 08/04/2025     08/04/2025    K 4.2 08/04/2025    CO2 27 08/04/2025     08/04/2025    BUN 14 08/04/2025    CREATININE 0.54 08/04/2025     Scheduled medications  Scheduled Medications[1]  Continuous medications  Continuous Medications[2]  PRN medications  PRN Medications[3]      Assessment & Plan    Acute hypoxic " respiratory failure  Possible underlying COPD.  She has not previously had pulmonary function test  She reports clinical improvement, however still hypoxic with ambulation    Tobacco use  She reports she started smoking at age 13 and was abstinent for 10 years, indicates a period of 42 years.    Abdominal hernia  Abdominal hernia.  Seen by general surgeon, indicated that there is no evidence of incarceration.    Plan  I discussed her findings today.  She still hypoxic with ambulation and oxygen saturation of 88%.  She insists that she is leaving today and will not stay in the hospital.  I will send a prescription for a prednisone taper to [facility] pharmacy in [place], however I explained that she will be leaving AGAINST MEDICAL ADVICE if she leaves.  She expressed understanding of the above.    Maria L Stark MD         [1] azithromycin, 500 mg, intravenous, q24h MYRA  budesonide, 0.5 mg, nebulization, BID  ipratropium-albuteroL, 3 mL, nebulization, TID  methylPREDNISolone sodium succinate (PF), 40 mg, intravenous, q12h  [2] lactated Ringer's, 100 mL/hr, Last Rate: 100 mL/hr (08/04/25 0912)  [3] PRN medications: acetaminophen **OR** acetaminophen **OR** acetaminophen, benzocaine-menthol, ipratropium-albuteroL, ketorolac, melatonin, morphine, morphine, ondansetron ODT **OR** ondansetron

## 2025-08-04 NOTE — PROGRESS NOTES
Kenya Diallo is a 65 y.o. female on day 1 of admission presenting with Incarcerated hernia.    Subjective   Feels okay.  Says her hernia does not bother her at all and is not tender.  Taking p.o. good without any nausea or vomiting or problems.  Passing gas and moving her bowels-went this morning.  Says she is leaving today.     Objective     Vital signs in last 24 hours:  Temp:  [36.5 °C (97.7 °F)-36.6 °C (97.9 °F)] 36.5 °C (97.7 °F)  Heart Rate:  [68-97] 68  Resp:  [18-24] 18  BP: (117-132)/(58-76) 122/76  Heart Rate:  [68-97]   Temp:  [36.5 °C (97.7 °F)-36.6 °C (97.9 °F)]   Resp:  [18-24]   BP: (117-132)/(58-76)   Weight:  [99.2 kg (218 lb 12.8 oz)]   SpO2:  [88 %-100 %]      Intake/Output last 3 Shifts:  I/O last 3 completed shifts:  In: 1506.7 (15.2 mL/kg) [P.O.:240; I.V.:1016.7 (10.2 mL/kg); IV Piggyback:250]  Out: 900 (9.1 mL/kg) [Urine:900 (0.3 mL/kg/hr)]  Weight: 99.2 kg     Physical Exam  In chair  No acute distress  Not septic appearing  Looks fine and comfortable  Alert and oriented  Abdomen soft, positive bowel sounds, nondistended, nontender, ventral hernia is soft, normal color, compressible, chronically incarcerated, nontender, benign    Scheduled medications  Scheduled Medications[1]  Continuous medications  Continuous Medications[2]  PRN medications  PRN Medications[3]    Relevant Results  Results from last 7 days   Lab Units 08/04/25  0433 08/02/25  2348   WBC AUTO x10*3/uL 23.1* 13.6*   HEMOGLOBIN g/dL 13.9 14.8   HEMATOCRIT % 39.6 45.0   PLATELETS AUTO x10*3/uL 263 282      Results from last 7 days   Lab Units 08/04/25  0433 08/02/25  2348   SODIUM mmol/L 138 136   POTASSIUM mmol/L 4.2 3.9   CHLORIDE mmol/L 105 102   CO2 mmol/L 27 27   BUN mg/dL 14 13   CREATININE mg/dL 0.54 0.72   GLUCOSE mg/dL 144* 130*   CALCIUM mg/dL 9.3 9.7       No results found for the last 7 days.    Imaging  CT abdomen pelvis w IV contrast  Result Date: 8/3/2025  1. Large supraumbilical fat containing hernia  demonstrating CT features seen with the strangulation including vascular and engorgement/congestion and stranding in the hernia sac.   2. Right ovarian cyst with imperceptibly thin wall and simple fluid density measuring 5.8 cm x 5.5 x 5.3 cm. Given patient's age, this should be evaluated with nonemergent ultrasound pelvis for any changes of internal complex evaluate suggest a cystic ovarian neoplasm. YELLOW ALERT: An incidental finding alert was sent per protocol.   3. Colonic diverticulosis without evidence of acute diverticulitis.   MACRO: Critical Finding:  See findings. Notification was initiated on 8/3/2025 at 1:54 am by  Patrice Flannery.  (**-YCF-**) Instructions: See Findings.   Signed by: Patrice Flannery 8/3/2025 1:54 AM Dictation workstation:   [id number]    XR chest 1 view  Result Date: 8/3/2025  Mild bibasilar atelectasis without acute disease in the chest otherwise.     MACRO: None.   Signed by: Patriec Flannery 8/3/2025 1:47 AM Dictation workstation:   [id number]      Cardiology, Vascular, and Other Imaging  No other imaging results found for the past 2 days       Assessment/Plan   Assessment & Plan  Incarcerated hernia  8/3 CT:  IMPRESSION:  1. Large supraumbilical fat containing hernia demonstrating CT  features seen with the strangulation including vascular and  engorgement/congestion and stranding in the hernia sac.    Ms. Diallo is a 64 yo female presenting with COPD symptoms making her chronic ventral hernia worse. She does not have concerning findings for strangulated bowel. The fat containing hernia could be managed as an outpatient however prior to any repair, I reiterated with patient per surgeon she would have to discuss weight loss and control of her COPD symptoms for the best chance at a durable repair with fewer complications.     Coty Ch, APRN-CNP               [1] azithromycin, 500 mg, intravenous, q24h MYRA  budesonide, 0.5 mg, nebulization, BID  ipratropium-albuteroL,  3 mL, nebulization, TID  methylPREDNISolone sodium succinate (PF), 40 mg, intravenous, q12h  [2]    [3] PRN medications: acetaminophen **OR** acetaminophen **OR** acetaminophen, benzocaine-menthol, ipratropium-albuteroL, ketorolac, melatonin, morphine, morphine, ondansetron ODT **OR** ondansetron

## 2025-08-04 NOTE — NURSING NOTE
While ambulating pt maintains 88% room air, pt now states sob has improved from yesterday, but still not at 100% to baseline.

## 2025-08-04 NOTE — CARE PLAN
The patient's goals for the shift include ambulation, safety, and stable vitals.      The clinical goals for the shift include ambulation, safety,  and stable vitals.

## 2025-08-04 NOTE — PROGRESS NOTES
Physical Therapy                 Therapy Communication Note    Patient Name: Kenya Diallo  MRN: [id number]  Department: [address]  Room: [facility]  Today's Date: 8/4/2025     Discipline: Physical Therapy    Visit Reason: Screen    Missed Time: 10:57    Comment: Order received, chart reviewed, and screen completed. Pt was observed ambulating independently within her room. She denied any concerns regarding functional mobility and agreed to be removed from the PT schedule. PT discontinued and order completed.

## 2025-08-04 NOTE — NURSING NOTE
"Pt notified this rn that she already had a ride coming to pick her up around noon. This nurse clarified that she would be leaving against medical advise because she is not medically cleared and  discharged yet. Pt agrees and aware...states \" she is feeling better and just can't sit here anymore.\" Spo2 fluctuating between 90-92% sitting room air. Recommended pt buy own home pulse ox and to monitor her 02 at home. States she does not have oxygen at home if needed.   Md crandall notified of pt wanting to dc.   "

## 2025-08-04 NOTE — CARE PLAN
The patient's goals for the shift include  wean 02     The clinical goals for the shift include stable vitals.

## 2025-08-05 ENCOUNTER — PATIENT OUTREACH (OUTPATIENT)
Dept: PRIMARY CARE | Facility: CLINIC | Age: 65
End: 2025-08-05
Payer: MEDICARE

## 2025-08-05 NOTE — ASSESSMENT & PLAN NOTE
8/3 CT:  IMPRESSION:  1. Large supraumbilical fat containing hernia demonstrating CT  features seen with the strangulation including vascular and  engorgement/congestion and stranding in the hernia sac.    Ms. Diallo is a 64 yo female presenting with COPD symptoms making her chronic ventral hernia worse. She does not have concerning findings for strangulated bowel. The fat containing hernia could be managed as an outpatient however prior to any repair, I reiterated with patient per surgeon she would have to discuss weight loss and control of her COPD symptoms for the best chance at a durable repair with fewer complications.

## 2025-08-05 NOTE — PROGRESS NOTES
No contact on first discharge outreach attempt. Message left. Will attempt outreach again at later time.    
[facility]

## 2025-08-06 ENCOUNTER — PATIENT OUTREACH (OUTPATIENT)
Dept: PRIMARY CARE | Facility: CLINIC | Age: 65
End: 2025-08-06
Payer: MEDICARE

## 2025-08-06 NOTE — PROGRESS NOTES
Discharge Facility: [facility]  Discharge Diagnosis:   Acute hypoxic respiratory failure  Suspected acute COPD exacerbation  Tobacco use  Abdominal hernia  Admission Date: 2 Aug 25  Discharge Date: 4 Aug 25    PCP Appointment Date: 12 Aug 25  Specialist Appointment Date: None  Hospital Encounter and Summary Linked: Yes  Admission (Discharged) with Maria L Stark MD (08/03/2025)   ED with Lionel Dukes MD (08/02/2025)     See discharge assessment below for further details     Wrap Up  Wrap Up Additional Comments: Pt stating she is doing much better since she left the hospital. pt did not have a pleasant experience in hospital. ED doctor made patient believe she was going to die from her Hernia, which she has had for quite some time. [facility]. pt confirms she gets bronchitis every now and then but has never been diagnosised with COPD by Pulmonologist. pt unhappy with Treatment staff at both hospitals. pt got no new medications when she left hospital. pt able to obtain all other medications without difficulty. pt confirms [facility] PCP and is agreeable to contact afterward. (8/6/2025  9:00 AM)    Engagement  Call Start Time: -- (spoke with patient) (8/6/2025  9:00 AM)    Medications  Medications reviewed with patient/caregiver?: Yes (8/6/2025  9:00 AM)  Is the patient having any side effects they believe may be caused by any medication additions or changes?: No (8/6/2025  9:00 AM)  Does the patient have all medications ordered at discharge?: Yes (8/6/2025  9:00 AM)  Prescription Comments: no new meds. all other medications obtained without difficulty (8/6/2025  9:00 AM)  Is the patient taking all medications as directed (includes completed medication regime)?: Yes (8/6/2025  9:00 AM)  Medication Comments: no questions on medications at this time. (8/6/2025  9:00 AM)    Appointments  Does the patient have a primary care provider?: Yes (follow up set by another for  12 Aug 25) (8/6/2025  9:00 AM)  Care Management Interventions: Verified appointment date/time/provider (8/6/2025  9:00 AM)  Has the patient kept scheduled appointments due by today?: Yes (8/6/2025  9:00 AM)  Care Management Interventions: Advised patient to keep appointment (8/6/2025  9:00 AM)    Self Management  What is the home health agency?: None (8/6/2025  9:00 AM)  Has home health visited the patient within 72 hours of discharge?: Not applicable (8/6/2025  9:00 AM)  What Durable Medical Equipment (DME) was ordered?: None (8/6/2025  9:00 AM)    Patient Teaching  Does the patient have access to their discharge instructions?: Yes (8/6/2025  9:00 AM)  Care Management Interventions: Reviewed instructions with patient (8/6/2025  9:00 AM)  What is the patient's perception of their health status since discharge?: Improving (8/6/2025  9:00 AM)  Is the patient/caregiver able to teach back the hierarchy of who to call/visit for symptoms/problems? PCP, Specialist, Home Health nurse, Urgent Care, ED, 911: Yes (8/6/2025  9:00 AM)  Patient/Caregiver Education Comments: None (8/6/2025  9:00 AM)

## 2025-08-12 ENCOUNTER — APPOINTMENT (OUTPATIENT)
Dept: PRIMARY CARE | Facility: CLINIC | Age: 65
End: 2025-08-12
Payer: MEDICARE

## 2025-08-12 VITALS
HEART RATE: 77 BPM | HEIGHT: 62 IN | SYSTOLIC BLOOD PRESSURE: 119 MMHG | WEIGHT: 212 LBS | BODY MASS INDEX: 39.01 KG/M2 | DIASTOLIC BLOOD PRESSURE: 74 MMHG

## 2025-08-12 DIAGNOSIS — N83.201 CYST OF RIGHT OVARY: ICD-10-CM

## 2025-08-12 DIAGNOSIS — Z12.31 ENCOUNTER FOR SCREENING MAMMOGRAM FOR MALIGNANT NEOPLASM OF BREAST: Primary | ICD-10-CM

## 2025-08-12 PROBLEM — K21.9 GASTROESOPHAGEAL REFLUX DISEASE: Status: ACTIVE | Noted: 2025-08-12

## 2025-08-12 PROCEDURE — 1111F DSCHRG MED/CURRENT MED MERGE: CPT | Performed by: FAMILY MEDICINE

## 2025-08-12 PROCEDURE — 1159F MED LIST DOCD IN RCRD: CPT | Performed by: FAMILY MEDICINE

## 2025-08-12 PROCEDURE — 99495 TRANSJ CARE MGMT MOD F2F 14D: CPT | Performed by: FAMILY MEDICINE

## 2025-08-12 PROCEDURE — 3008F BODY MASS INDEX DOCD: CPT | Performed by: FAMILY MEDICINE

## 2025-08-12 RX ORDER — SODIUM PICOSULFATE, MAGNESIUM OXIDE, AND ANHYDROUS CITRIC ACID 12; 3.5; 1 G/175ML; G/175ML; MG/175ML
LIQUID ORAL
COMMUNITY
Start: 2025-08-11 | End: 2025-08-13

## 2025-08-12 RX ORDER — OMEPRAZOLE 20 MG/1
20 CAPSULE, DELAYED RELEASE ORAL
COMMUNITY
Start: 2025-08-11

## 2025-08-13 ENCOUNTER — PATIENT OUTREACH (OUTPATIENT)
Dept: PRIMARY CARE | Facility: CLINIC | Age: 65
End: 2025-08-13
Payer: MEDICARE

## 2025-08-18 ENCOUNTER — HOSPITAL ENCOUNTER (OUTPATIENT)
Dept: RADIOLOGY | Facility: HOSPITAL | Age: 65
Discharge: HOME | End: 2025-08-18
Payer: MEDICARE

## 2025-08-18 VITALS — BODY MASS INDEX: 39.01 KG/M2 | WEIGHT: 212 LBS | HEIGHT: 62 IN

## 2025-08-18 DIAGNOSIS — Z12.31 ENCOUNTER FOR SCREENING MAMMOGRAM FOR MALIGNANT NEOPLASM OF BREAST: ICD-10-CM

## 2025-08-18 DIAGNOSIS — N83.201 CYST OF RIGHT OVARY: ICD-10-CM

## 2025-08-18 PROCEDURE — 76857 US EXAM PELVIC LIMITED: CPT | Performed by: RADIOLOGY

## 2025-08-18 PROCEDURE — 77067 SCR MAMMO BI INCL CAD: CPT | Performed by: STUDENT IN AN ORGANIZED HEALTH CARE EDUCATION/TRAINING PROGRAM

## 2025-08-18 PROCEDURE — 77063 BREAST TOMOSYNTHESIS BI: CPT | Performed by: STUDENT IN AN ORGANIZED HEALTH CARE EDUCATION/TRAINING PROGRAM

## 2025-08-18 PROCEDURE — 76856 US EXAM PELVIC COMPLETE: CPT

## 2025-08-18 PROCEDURE — 77067 SCR MAMMO BI INCL CAD: CPT

## 2025-12-12 ENCOUNTER — APPOINTMENT (OUTPATIENT)
Dept: PRIMARY CARE | Facility: CLINIC | Age: 65
End: 2025-12-12
Payer: MEDICARE